# Patient Record
Sex: MALE | Race: WHITE | NOT HISPANIC OR LATINO | Employment: OTHER | URBAN - METROPOLITAN AREA
[De-identification: names, ages, dates, MRNs, and addresses within clinical notes are randomized per-mention and may not be internally consistent; named-entity substitution may affect disease eponyms.]

---

## 2017-02-21 ENCOUNTER — LAB VISIT (OUTPATIENT)
Dept: LAB | Facility: HOSPITAL | Age: 67
End: 2017-02-21
Attending: INTERNAL MEDICINE
Payer: MEDICARE

## 2017-02-21 ENCOUNTER — OFFICE VISIT (OUTPATIENT)
Dept: INTERNAL MEDICINE | Facility: CLINIC | Age: 67
End: 2017-02-21
Payer: MEDICARE

## 2017-02-21 VITALS
WEIGHT: 184.75 LBS | BODY MASS INDEX: 25.02 KG/M2 | DIASTOLIC BLOOD PRESSURE: 92 MMHG | HEART RATE: 72 BPM | HEIGHT: 72 IN | SYSTOLIC BLOOD PRESSURE: 148 MMHG

## 2017-02-21 DIAGNOSIS — R42 LIGHTHEADEDNESS: ICD-10-CM

## 2017-02-21 DIAGNOSIS — M79.10 MUSCLE ACHE: ICD-10-CM

## 2017-02-21 DIAGNOSIS — R42 LIGHTHEADEDNESS: Primary | ICD-10-CM

## 2017-02-21 DIAGNOSIS — R00.2 FLUTTERING SENSATION OF HEART: ICD-10-CM

## 2017-02-21 DIAGNOSIS — K21.9 GASTROESOPHAGEAL REFLUX DISEASE WITHOUT ESOPHAGITIS: ICD-10-CM

## 2017-02-21 LAB
ALBUMIN SERPL BCP-MCNC: 4.3 G/DL
ALP SERPL-CCNC: 111 U/L
ALT SERPL W/O P-5'-P-CCNC: 22 U/L
ANION GAP SERPL CALC-SCNC: 8 MMOL/L
AST SERPL-CCNC: 21 U/L
BASOPHILS # BLD AUTO: 0.04 K/UL
BASOPHILS NFR BLD: 0.5 %
BILIRUB SERPL-MCNC: 1.1 MG/DL
BILIRUB UR QL STRIP: NEGATIVE
BUN SERPL-MCNC: 15 MG/DL
CALCIUM SERPL-MCNC: 9.4 MG/DL
CHLORIDE SERPL-SCNC: 103 MMOL/L
CLARITY UR REFRACT.AUTO: CLEAR
CO2 SERPL-SCNC: 29 MMOL/L
COLOR UR AUTO: YELLOW
CREAT SERPL-MCNC: 1 MG/DL
DIFFERENTIAL METHOD: ABNORMAL
EOSINOPHIL # BLD AUTO: 0.6 K/UL
EOSINOPHIL NFR BLD: 7.4 %
ERYTHROCYTE [DISTWIDTH] IN BLOOD BY AUTOMATED COUNT: 13.2 %
EST. GFR  (AFRICAN AMERICAN): >60 ML/MIN/1.73 M^2
EST. GFR  (NON AFRICAN AMERICAN): >60 ML/MIN/1.73 M^2
GLUCOSE SERPL-MCNC: 95 MG/DL
GLUCOSE UR QL STRIP: NEGATIVE
HCT VFR BLD AUTO: 46 %
HGB BLD-MCNC: 16.2 G/DL
HGB UR QL STRIP: NEGATIVE
KETONES UR QL STRIP: NEGATIVE
LEUKOCYTE ESTERASE UR QL STRIP: NEGATIVE
LYMPHOCYTES # BLD AUTO: 2.3 K/UL
LYMPHOCYTES NFR BLD: 31.3 %
MAGNESIUM SERPL-MCNC: 1.9 MG/DL
MCH RBC QN AUTO: 31.2 PG
MCHC RBC AUTO-ENTMCNC: 35.2 %
MCV RBC AUTO: 89 FL
MONOCYTES # BLD AUTO: 0.4 K/UL
MONOCYTES NFR BLD: 5.9 %
NEUTROPHILS # BLD AUTO: 4.1 K/UL
NEUTROPHILS NFR BLD: 54.6 %
NITRITE UR QL STRIP: NEGATIVE
PH UR STRIP: 5 [PH] (ref 5–8)
PLATELET # BLD AUTO: 272 K/UL
PMV BLD AUTO: 10.8 FL
POTASSIUM SERPL-SCNC: 4.1 MMOL/L
PROT SERPL-MCNC: 7.5 G/DL
PROT UR QL STRIP: NEGATIVE
RBC # BLD AUTO: 5.19 M/UL
SODIUM SERPL-SCNC: 140 MMOL/L
SP GR UR STRIP: 1.01 (ref 1–1.03)
URN SPEC COLLECT METH UR: NORMAL
UROBILINOGEN UR STRIP-ACNC: NEGATIVE EU/DL
WBC # BLD AUTO: 7.47 K/UL

## 2017-02-21 PROCEDURE — 80053 COMPREHEN METABOLIC PANEL: CPT

## 2017-02-21 PROCEDURE — 36415 COLL VENOUS BLD VENIPUNCTURE: CPT

## 2017-02-21 PROCEDURE — 93010 ELECTROCARDIOGRAM REPORT: CPT | Mod: ,,, | Performed by: INTERNAL MEDICINE

## 2017-02-21 PROCEDURE — 83735 ASSAY OF MAGNESIUM: CPT

## 2017-02-21 PROCEDURE — 99999 PR PBB SHADOW E&M-EST. PATIENT-LVL III: CPT | Mod: PBBFAC,,, | Performed by: INTERNAL MEDICINE

## 2017-02-21 PROCEDURE — 85025 COMPLETE CBC W/AUTO DIFF WBC: CPT

## 2017-02-21 PROCEDURE — 99215 OFFICE O/P EST HI 40 MIN: CPT | Mod: S$PBB,,, | Performed by: INTERNAL MEDICINE

## 2017-02-21 RX ORDER — PANTOPRAZOLE SODIUM 40 MG/1
40 TABLET, DELAYED RELEASE ORAL DAILY
Qty: 30 TABLET | Refills: 11 | Status: SHIPPED | OUTPATIENT
Start: 2017-02-21 | End: 2017-12-11

## 2017-02-21 NOTE — PROGRESS NOTES
CHIEF COMPLAINT:  Fluttering in chest and lightheadedness.    HISTORY OF PRESENT ILLNESS:  The patient is a 66-year-old gentleman who comes in   today with complaints of a weird feeling in his chest.  It is like a bubbling   or fluttering type sensation.  It comes and goes.  It can last a minute or so or   less.  It happens several times throughout the day.  Some days, it does not   happen at all.  It started a few months ago.  It feels like he needs to stop   whatever he is doing when it occurs.  Sometimes, he does not.  Sometimes, he   does.  He also reports feeling a little lightheaded, cannot be more specific   than that.  His head feels a little weird, sometimes associated with his heart   fluttering and sometimes not.    REVIEW OF SYSTEMS:  The patient does report weight loss.  He is trying to get   his weight down.  He went from around 191 to 184.  He has done so by watching   his intake and cutting out red meat.  He does eat fish.  No visual change.  No   auditory change.  No dysphagia.  He was walking 4 to 5 times a week, but stopped   secondary to these fluttering in the chest.  He wanted to get this worked up   first.  No nausea or vomiting.  No abdominal pain.  He does report having some   reflux-type symptoms where he gets a taste of acid in the back of his throat.    He does state that sometimes he will make himself belch, which may or may not   help the fluttering.  No loss of bowel control.  No problems with his bladder.    No weakness in the arms or legs.  He does get some twinges of pain in the legs.    He did have 1 episode of pain and numbness in the right hand, which lasted just   1 day.    PHYSICAL EXAMINATION:  GENERAL APPEARANCE:  No acute distress.  HEENT:  Conjunctivae are clear.  Pupils are equal and reactive.  TMs are   obscured by wax with the right being worse than the left.  Nasal septum is   midline without discharge.  Oropharynx is without erythema.  The patient does   have some  carious molars.  Trachea is midline without thyromegaly.  PULMONARY:  Good inspiratory and expiratory breath sounds are heard.  Lungs are   clear to auscultation.  CARDIOVASCULAR:  S1 and S2.  EXTREMITIES:  Without edema.  ABDOMEN:  Nontender, nondistended and without hepatosplenomegaly.  VASCULAR:  The patient's carotids were checked.  He had 2+ carotid pulses   without bruits.    ASSESSMENT:  1.  Lightheadedness.  2.  Fluttering sensation in the chest.  3.  Muscle aches.  4.  Reflux.    PLAN:  We will go ahead and put the patient in for a 24-hour Holter.  We will   get a CBC, CMP and magnesium level.  We will also schedule a UA.  We will start   him on Protonix once a day.  The patient is to follow up pending results.      CONNIE/KATE  dd: 02/21/2017 14:22:50 (CST)  td: 02/22/2017 06:47:09 (CST)  Doc ID   #[id number]  Job ID #[id number]    CC:

## 2017-02-21 NOTE — MR AVS SNAPSHOT
[facility]  [address]  [place] [address]  Phone: [phone]  Fax: [phone]                  David Martin   2017 10:00 AM   Office Visit    Description:  Male : 1950   Provider:  Alexandro Hair MD   Department:  [facility]           Reason for Visit     Follow-up           Diagnoses this Visit        Comments    Lightheadedness    -  Primary     Fluttering sensation of heart         Muscle ache         Gastroesophageal reflux disease without esophagitis                To Do List           Goals (5 Years of Data)     None       These Medications        Disp Refills Start End    pantoprazole (PROTONIX) 40 MG tablet 30 tablet 11 2017 3/23/2017    Take 1 tablet (40 mg total) by mouth once daily. - Oral    Pharmacy: [facility] [address]  [place], [place] [facility] [address] [facility] [address] [facility] [address] Ph #: [phone]         [facility] On Call     [facility] On Call Nurse Care Line -  Assistance  Registered nurses in the [facility] On Call Center provide clinical advisement, health education, appointment booking, and other advisory services.  Call for this free service at [phone].             Medications           Message regarding Medications     Verify the changes and/or additions to your medication regime listed below are the same as discussed with your clinician today.  If any of these changes or additions are incorrect, please notify your healthcare provider.        START taking these NEW medications        Refills    pantoprazole (PROTONIX) 40 MG tablet 11    Sig: Take 1 tablet (40 mg total) by mouth once daily.    Class: Normal    Route: Oral      STOP taking these medications     predniSONE (DELTASONE) 10 MG tablet 3 tablets daily x 3 days, 2 tablets daily x 3 days, 1 tablet daily x 3 days           Verify that the below list of medications is an accurate representation of the medications you are currently taking.   If none reported, the list may be blank. If incorrect, please contact your healthcare provider. Carry this list with you in case of emergency.           Current Medications     albuterol 90 mcg/actuation inhaler Inhale 2 puffs into the lungs every 6 (six) hours as needed for Wheezing. Ventolin inhaler    sodium,potassium,mag sulfates (SUPREP BOWEL PREP KIT) 17.5-3.13-1.6 gram SolR Take 1 Bottle by mouth as directed.    VERAMYST 27.5 mcg/actuation nasal spray 1 spray by Nasal route once daily.    zolpidem (AMBIEN) 10 mg Tab     pantoprazole (PROTONIX) 40 MG tablet Take 1 tablet (40 mg total) by mouth once daily.           Clinical Reference Information           Your Vitals Were     BP Pulse Height Weight BMI    148/92 (BP Location: Right arm, Patient Position: Sitting, BP Method: Manual) 72 6' (1.829 m) 83.8 kg (184 lb 11.9 oz) 25.06 kg/m2      Blood Pressure          Most Recent Value    BP  (!)  148/92      Allergies as of 2/21/2017     Pollen Extracts    Ragweed      Immunizations Administered on Date of Encounter - 2/21/2017     None      Orders Placed During Today's Visit      Normal Orders This Visit    Urinalysis     Future Labs/Procedures Expected by Expires    CBC auto differential  2/21/2017 2/21/2018    Magnesium  2/21/2017 2/21/2018    Comprehensive metabolic panel  8/24/2017 (Approximate) 2/21/2018    Holter monitor - 24 hour  As directed 2/21/2018    SCHEDULED EKG 12-LEAD (to Muse)  As directed 2/21/2018      Language Assistance Services     ATTENTION: Language assistance services are available, free of charge. Please call [phone].      ATENCIÓN: Si habla español, tiene a avitia disposición servicios gratuitos de asistencia lingüística. Llame al [phone].     CRAIG Ý: N?u b?n nói Ti?ng Vi?t, có các d?ch v? h? tr? ngôn ng? mi?n phí dành cho b?n. G?i s? [phone].         [facility] complies with applicable Federal civil rights laws and does not discriminate on the basis of  race, color, national origin, age, disability, or sex.

## 2017-02-24 ENCOUNTER — CLINICAL SUPPORT (OUTPATIENT)
Dept: ELECTROPHYSIOLOGY | Facility: CLINIC | Age: 67
End: 2017-02-24
Payer: MEDICARE

## 2017-02-24 ENCOUNTER — NURSE TRIAGE (OUTPATIENT)
Dept: ADMINISTRATIVE | Facility: CLINIC | Age: 67
End: 2017-02-24

## 2017-02-24 DIAGNOSIS — R00.2 FLUTTERING SENSATION OF HEART: ICD-10-CM

## 2017-02-24 PROCEDURE — 93227 XTRNL ECG REC<48 HR R&I: CPT | Mod: S$PBB,,, | Performed by: INTERNAL MEDICINE

## 2017-02-24 PROCEDURE — 93226 XTRNL ECG REC<48 HR SCAN A/R: CPT | Mod: PBBFAC | Performed by: INTERNAL MEDICINE

## 2017-02-24 NOTE — TELEPHONE ENCOUNTER
Reason for Disposition   General information question, no triage required and triager able to answer question    Protocols used: ST INFORMATION ONLY CALL-A-AH  question re:holter monitor   --answered     Chari Queen RN

## 2017-03-01 ENCOUNTER — TELEPHONE (OUTPATIENT)
Dept: INTERNAL MEDICINE | Facility: CLINIC | Age: 67
End: 2017-03-01

## 2017-03-01 DIAGNOSIS — R07.89 CHEST DISCOMFORT: ICD-10-CM

## 2017-03-01 DIAGNOSIS — R00.2 HEART PALPITATIONS: Primary | ICD-10-CM

## 2017-03-01 NOTE — TELEPHONE ENCOUNTER
----- Message from Alexandro Hair MD sent at 3/1/2017  7:55 AM CST -----  Please contact patient. Notify that his heart monitor did not show a cause for his heart fluttering. I would like to get a stress echo. Orders are in.

## 2017-03-08 ENCOUNTER — HOSPITAL ENCOUNTER (OUTPATIENT)
Dept: CARDIOLOGY | Facility: CLINIC | Age: 67
Discharge: HOME | End: 2017-03-08
Payer: MEDICARE

## 2017-03-08 DIAGNOSIS — R00.2 HEART PALPITATIONS: ICD-10-CM

## 2017-03-08 DIAGNOSIS — R07.89 CHEST DISCOMFORT: ICD-10-CM

## 2017-03-08 LAB
DIASTOLIC DYSFUNCTION: NO
RETIRED EF AND QEF - SEE NOTES: 60 (ref 55–65)

## 2017-03-08 PROCEDURE — 93321 DOPPLER ECHO F-UP/LMTD STD: CPT | Mod: 26,S$PBB,, | Performed by: INTERNAL MEDICINE

## 2017-03-08 PROCEDURE — 93351 STRESS TTE COMPLETE: CPT | Mod: PBBFAC | Performed by: INTERNAL MEDICINE

## 2017-03-09 ENCOUNTER — TELEPHONE (OUTPATIENT)
Dept: INTERNAL MEDICINE | Facility: CLINIC | Age: 67
End: 2017-03-09

## 2017-03-09 NOTE — TELEPHONE ENCOUNTER
----- Message from Nadya Barriga sent at 3/9/2017  6:14 AM CST -----  Contact: self  Patient states still experiencing dizziness need appointment for  tomorrow.   Please call pt at [phone]

## 2017-03-14 ENCOUNTER — OFFICE VISIT (OUTPATIENT)
Dept: INTERNAL MEDICINE | Facility: CLINIC | Age: 67
End: 2017-03-14
Payer: MEDICARE

## 2017-03-14 VITALS
DIASTOLIC BLOOD PRESSURE: 91 MMHG | HEART RATE: 59 BPM | BODY MASS INDEX: 25.44 KG/M2 | SYSTOLIC BLOOD PRESSURE: 151 MMHG | WEIGHT: 187.81 LBS | HEIGHT: 72 IN

## 2017-03-14 DIAGNOSIS — H61.21 IMPACTED CERUMEN OF RIGHT EAR: ICD-10-CM

## 2017-03-14 DIAGNOSIS — R42 DIZZINESS: Primary | ICD-10-CM

## 2017-03-14 DIAGNOSIS — E78.5 HYPERLIPIDEMIA, UNSPECIFIED HYPERLIPIDEMIA TYPE: ICD-10-CM

## 2017-03-14 PROCEDURE — 99213 OFFICE O/P EST LOW 20 MIN: CPT | Mod: PBBFAC | Performed by: INTERNAL MEDICINE

## 2017-03-14 PROCEDURE — 99214 OFFICE O/P EST MOD 30 MIN: CPT | Mod: S$PBB,,, | Performed by: INTERNAL MEDICINE

## 2017-03-14 PROCEDURE — 99999 PR PBB SHADOW E&M-EST. PATIENT-LVL III: CPT | Mod: PBBFAC,,, | Performed by: INTERNAL MEDICINE

## 2017-03-14 NOTE — MR AVS SNAPSHOT
[facility] - Internal Medicine  [address] [facility] [address]  Phone: [phone]  Fax: [phone]                  David Martin   3/14/2017 9:30 AM   Office Visit    Description:  Male : 1950   Provider:  Alexandro Hair MD   Department:  [facility]           Reason for Visit     Dizziness           Diagnoses this Visit        Comments    Dizziness    -  Primary     Hyperlipidemia, unspecified hyperlipidemia type         Impacted cerumen of right ear                To Do List           Future Appointments        Provider Department Dept Phone    3/16/2017 9:00 AM LAB, SAME DAY [facility] [phone]    5/3/2017 3:00 PM Herman Rodriguez MD [facility] [phone]      Goals (5 Years of Data)     None      [facility] On Call     [facility] On Call Nurse Care Line - /7 Assistance  Registered nurses in the [facility] On Call Center provide clinical advisement, health education, appointment booking, and other advisory services.  Call for this free service at [phone].             Medications           Message regarding Medications     Verify the changes and/or additions to your medication regime listed below are the same as discussed with your clinician today.  If any of these changes or additions are incorrect, please notify your healthcare provider.             Verify that the below list of medications is an accurate representation of the medications you are currently taking.  If none reported, the list may be blank. If incorrect, please contact your healthcare provider. Carry this list with you in case of emergency.           Current Medications     albuterol 90 mcg/actuation inhaler Inhale 2 puffs into the lungs every 6 (six) hours as needed for Wheezing. Ventolin inhaler    pantoprazole (PROTONIX) 40 MG tablet Take 1 tablet (40 mg total) by mouth once daily.    sodium,potassium,mag sulfates (SUPREP BOWEL PREP KIT)  17.5-3.13-1.6 gram SolR Take 1 Bottle by mouth as directed.    VERAMYST 27.5 mcg/actuation nasal spray 1 spray by Nasal route once daily.    zolpidem (AMBIEN) 10 mg Tab            Clinical Reference Information           Your Vitals Were     BP Pulse Height Weight BMI    151/91 (BP Location: Left arm, Patient Position: Sitting, BP Method: Manual) 59 6' (1.829 m) 85.2 kg (187 lb 13.3 oz) 25.47 kg/m2      Blood Pressure          Most Recent Value    BP  (!)  151/91      Allergies as of 3/14/2017     Pollen Extracts    Ragweed      Immunizations Administered on Date of Encounter - 3/14/2017     None      Orders Placed During Today's Visit      Normal Orders This Visit    Ambulatory Referral to ENT     Future Labs/Procedures Expected by Expires    Lipid panel  3/14/2017 3/14/2018      Language Assistance Services     ATTENTION: Language assistance services are available, free of charge. Please call [phone].      ATENCIÓN: Si habla ricañol, tiene a avitia disposición servicios gratuitos de asistencia lingüística. Llame al [phone].     CHÚ Ý: N?u b?n nói Ti?ng Vi?t, có các d?ch v? h? tr? ngôn ng? mi?n phí molinah cho b?n. G?i s? [phone].         [facility] complies with applicable Federal civil rights laws and does not discriminate on the basis of race, color, national origin, age, disability, or sex.

## 2017-03-15 ENCOUNTER — PATIENT MESSAGE (OUTPATIENT)
Dept: OTOLARYNGOLOGY | Facility: CLINIC | Age: 67
End: 2017-03-15

## 2017-03-15 NOTE — PROGRESS NOTES
CHIEF COMPLAINT:  Followup.    HISTORY OF PRESENT ILLNESS:  The patient is a 66-year-old gentleman who comes in   today for followup of dizziness as well as chest discomfort.  The patient had a   negative stress test done.  The day after the stress test, he developed some   pain in that 10 in intensity in the epigastric area.  It occurred after eating.    Belching appeared to relieve the symptoms.  The patient also reports noticing   some blood in his stool.  He was some pink material in the toilet.  He noticed   this three times over the past month.  No problems with straining.  In regards   to the dizziness, is not all the time.  His ears have been popping.  He was   using Veramyst.  Symptoms got better with stopping this nasal spray.    REVIEW OF SYSTEMS:  No fever or chills.  The patient has not started walking   yet.  He does state when he was walking on the treadmill, he could have gone   more, but he had already gotten to 99% on what he could see on the machine, so   he asked to stop.  He states that the technician who was doing test asked him   why he wanted to stop.  He told him the reasons why he had already reached or   achieved was 99% on the machine.  He states that the technician recorded that he   became tired.  No bowel changes.    PHYSICAL EXAMINATION:  VITAL SIGNS:  Repeat blood pressure is 150/80.  HEENT:  Conjunctivae clear.  Pupils are equal.  Left TM is partially obscured by   wax, right is blocked by wax.  Nasal septum is midline without discharge.    Oropharynx is without erythema.  PULMONARY:  Good inspiratory, expiratory breath sounds are heard.  Lungs are   clear to auscultation.  CARDIOVASCULAR:  S1, S2.  EXTREMITIES:  Without edema.  ABDOMEN:  Nontender, nondistended, without hepatosplenomegaly.    ASSESSMENT:  1.  Dizziness.  2.  Hyperlipidemia.  3.  Cerumen impaction.  4.  Blood in the toilet.    PLAN:  The patient already has orders in for a colonoscopy.  He was strongly   encouraged  to get this procedure done.  We refer him to ENT for his ears.  We   will check a lipid panel.  He is to follow up pending results.      CONNIE/IN  dd: 03/15/2017 15:00:25 (CDT)  td: 03/16/2017 06:00:23 (CDT)  Doc ID   #[id number]  Job ID #[id number]    CC:

## 2017-03-16 ENCOUNTER — LAB VISIT (OUTPATIENT)
Dept: LAB | Facility: HOSPITAL | Age: 67
End: 2017-03-16
Attending: INTERNAL MEDICINE
Payer: MEDICARE

## 2017-03-16 DIAGNOSIS — E78.5 HYPERLIPIDEMIA, UNSPECIFIED HYPERLIPIDEMIA TYPE: ICD-10-CM

## 2017-03-16 LAB
CHOLEST/HDLC SERPL: 5.7 {RATIO}
HDL/CHOLESTEROL RATIO: 17.4 %
HDLC SERPL-MCNC: 241 MG/DL
HDLC SERPL-MCNC: 42 MG/DL
LDLC SERPL CALC-MCNC: 174.8 MG/DL
NONHDLC SERPL-MCNC: 199 MG/DL
TRIGL SERPL-MCNC: 121 MG/DL

## 2017-03-16 PROCEDURE — 80061 LIPID PANEL: CPT

## 2017-03-16 PROCEDURE — 36415 COLL VENOUS BLD VENIPUNCTURE: CPT

## 2017-03-20 ENCOUNTER — PATIENT MESSAGE (OUTPATIENT)
Dept: INTERNAL MEDICINE | Facility: CLINIC | Age: 67
End: 2017-03-20

## 2017-03-21 ENCOUNTER — INITIAL CONSULT (OUTPATIENT)
Dept: OPTOMETRY | Facility: CLINIC | Age: 67
End: 2017-03-21
Payer: MEDICARE

## 2017-03-21 ENCOUNTER — OFFICE VISIT (OUTPATIENT)
Dept: OTOLARYNGOLOGY | Facility: CLINIC | Age: 67
End: 2017-03-21
Payer: MEDICARE

## 2017-03-21 VITALS
BODY MASS INDEX: 25.41 KG/M2 | WEIGHT: 187.38 LBS | SYSTOLIC BLOOD PRESSURE: 156 MMHG | HEART RATE: 62 BPM | DIASTOLIC BLOOD PRESSURE: 89 MMHG

## 2017-03-21 DIAGNOSIS — H61.23 IMPACTED CERUMEN OF BOTH EARS: ICD-10-CM

## 2017-03-21 DIAGNOSIS — H15.101 EPISCLERITIS OF RIGHT EYE: Primary | ICD-10-CM

## 2017-03-21 DIAGNOSIS — H93.8X3 SENSATION OF FULLNESS IN BOTH EARS: ICD-10-CM

## 2017-03-21 PROCEDURE — 69210 REMOVE IMPACTED EAR WAX UNI: CPT | Mod: S$PBB,,, | Performed by: OTOLARYNGOLOGY

## 2017-03-21 PROCEDURE — 99212 OFFICE O/P EST SF 10 MIN: CPT | Mod: PBBFAC,27,25 | Performed by: OPTOMETRIST

## 2017-03-21 PROCEDURE — 92002 INTRM OPH EXAM NEW PATIENT: CPT | Mod: S$PBB,,, | Performed by: OPTOMETRIST

## 2017-03-21 PROCEDURE — 99999 PR PBB SHADOW E&M-EST. PATIENT-LVL II: CPT | Mod: PBBFAC,,, | Performed by: OPTOMETRIST

## 2017-03-21 PROCEDURE — 99999 PR PBB SHADOW E&M-EST. PATIENT-LVL III: CPT | Mod: PBBFAC,,, | Performed by: OTOLARYNGOLOGY

## 2017-03-21 PROCEDURE — 99213 OFFICE O/P EST LOW 20 MIN: CPT | Mod: 25,S$PBB,, | Performed by: OTOLARYNGOLOGY

## 2017-03-21 RX ORDER — FLUOROMETHOLONE 1 MG/ML
1 SUSPENSION/ DROPS OPHTHALMIC 4 TIMES DAILY
Qty: 5 ML | Refills: 0 | Status: SHIPPED | OUTPATIENT
Start: 2017-03-21 | End: 2017-03-25

## 2017-03-21 NOTE — PROGRESS NOTES
Subjective:       Patient ID: David Martin is a 66 y.o. male.    Chief Complaint: Ear Fullness    Ear Fullness    There is pain in both ears. This is a new problem. The current episode started more than 1 month ago. The problem occurs constantly. The problem has been unchanged. There has been no fever. The patient is experiencing no pain. Associated symptoms include hearing loss. Pertinent negatives include no coughing, diarrhea, ear discharge, headaches, neck pain, rash, rhinorrhea or vomiting. He has tried nothing for the symptoms. His past medical history is significant for hearing loss. There is no history of a chronic ear infection or a tympanostomy tube.     Review of Systems   Constitutional: Negative for activity change, appetite change and fever.   HENT: Positive for hearing loss. Negative for congestion, ear discharge, ear pain, nosebleeds, postnasal drip, rhinorrhea and sneezing.         New onset bilateral ear fullness dating back to last September.   Eyes: Negative for redness and visual disturbance.   Respiratory: Negative for apnea, cough, shortness of breath and wheezing.    Cardiovascular: Negative for chest pain and palpitations.   Gastrointestinal: Negative for diarrhea and vomiting.   Genitourinary: Negative for difficulty urinating and frequency.   Musculoskeletal: Negative for arthralgias, back pain, gait problem and neck pain.   Skin: Negative for color change and rash.   Neurological: Negative for dizziness, speech difficulty, weakness and headaches.   Hematological: Negative for adenopathy. Does not bruise/bleed easily.   Psychiatric/Behavioral: Negative for agitation and behavioral problems.       Objective:        Constitutional:   Vital signs are normal. He appears well-developed and well-nourished. He is active. Normal speech.      Head:  Normocephalic and atraumatic. Salivary glands normal.  Facial strength is normal.      Ears:    Right Ear: Decreased hearing is noted.   Left Ear:  Decreased hearing is noted.       PROCEDURE NOTE:  Ceruminosis is noted in both EACs.  Wax was removed by manual debridement and suctioning utilizing the assistance of binocular microscopy revealing EACs and TMs WNL. The patient tolerated this procedure without difficulty. The subjective decrease noted in hearing pre-cleaning was resolved post-cleaning.        Nose:  Nose normal including turbinates, nasal mucosa, sinuses and nasal septum.     Mouth/Throat  Oropharynx clear and moist without lesions or asymmetry and normal uvula midline.     Neck:  Neck normal without thyromegaly masses, asymmetry, normal tracheal structure, crepitus, and tenderness, thyroid normal, trachea normal, phonation normal and full range of motion with neck supple.     Psychiatric:   He has a normal mood and affect. His speech is normal and behavior is normal.     Neurological:   He has neurological normal, alert and oriented.     Skin:   No abrasions, lacerations, lesions, or rashes.       Assessment:       1. Sensation of fullness in both ears    2. Impacted cerumen of both ears        Plan:       1. Hearing conservation strongly recommended.  2. Trial of amplification bilaterally may be indicated but only after audiometric testing is documented.  3. Re-check of hearing in 18-24 months or sooner if subjective change noted.  4. F/U with PCP as per schedule.

## 2017-03-21 NOTE — PROGRESS NOTES
HPI     Patient's age: 66 y.o.    Approximate date of last eye examination:  6-7 mos ago   [facility]    Pt states that right eye was having pain sensitive to touch and with   movement, yesterday was really red.  Felt as if something was in it x   statrted a week ago but got worse in the last few days, today not as   worse.    Wears glasses? yes      Wears CLs?:  Yes, out at the moment, do not sleep in them,  part-time wear                         Headaches?  no  Eye pain/discomfort?  yes                                                                                    Flashes?  no  Floaters?  no  Diplopia/Double vision?  no    Patient's Ocular History:         Any eye surgeries? no         Any eye injury?  no         Any treatment for eye disease?  no  Family history of eye disease?  no    Significant patient medical history:         1. Diabetes?  no       If yes, IDDM or NIDDM? no   2. HBP?  borderline                 ! OTC eyedrops currently using:  none   ! Prescription eye meds currently using:  none            Last edited by Erlinda Nguyen MA on 3/21/2017 10:27 AM.         Assessment /Plan     For exam results, see Encounter Report.    Episcleritis of right eye    Other orders  -     fluorometholone 0.1% (FML) 0.1 % DrpS; Place 1 drop into both eyes 4 (four) times daily. QID x 4 days then BID x 1 week  Dispense: 5 mL; Refill: 0    RTC if condition worsens or does not improve    RTC for annual

## 2017-03-21 NOTE — MR AVS SNAPSHOT
[facility] Otorhinolaryngology  [address]  [facility] [address]  Phone: [phone]  Fax: [phone]                  David Martin   3/21/2017 8:30 AM   Office Visit    Description:  Male : 1950   Provider:  Ayo Grove MD   [facility]           Reason for Visit     Ear Fullness           Diagnoses this Visit        Comments    Sensation of fullness in both ears         Impacted cerumen of both ears                To Do List           Future Appointments        Provider Department Dept Phone    3/21/2017 8:30 AM Ayo Grove MD [facility] Otorhinolaryngology [phone]    3/23/2017 10:15 AM Kadi King OD [facility] - Optometry [phone]    5/3/2017 3:00 PM Herman Rodriguez MD [facility] - Voice Center [phone]      Goals (5 Years of Data)     None      Follow-Up and Disposition     Return if symptoms worsen or fail to improve.      [facility] On Call     [facility] On Call Nurse Care Line -  Assistance  Registered nurses in the [facility] Call Center provide clinical advisement, health education, appointment booking, and other advisory services.  Call for this free service at [phone].             Medications           Message regarding Medications     Verify the changes and/or additions to your medication regime listed below are the same as discussed with your clinician today.  If any of these changes or additions are incorrect, please notify your healthcare provider.        STOP taking these medications     albuterol 90 mcg/actuation inhaler Inhale 2 puffs into the lungs every 6 (six) hours as needed for Wheezing. Ventolin inhaler    sodium,potassium,mag sulfates (SUPREP BOWEL PREP KIT) 17.5-3.13-1.6 gram SolR Take 1 Bottle by mouth as directed.           Verify that the below list of medications is an accurate representation of the medications you are currently taking.  If none reported, the list may be blank. If  incorrect, please contact your healthcare provider. Carry this list with you in case of emergency.           Current Medications     pantoprazole (PROTONIX) 40 MG tablet Take 1 tablet (40 mg total) by mouth once daily.    VERAMYST 27.5 mcg/actuation nasal spray 1 spray by Nasal route once daily.    zolpidem (AMBIEN) 10 mg Tab            Clinical Reference Information           Your Vitals Were     BP Pulse Weight BMI       156/89 (BP Location: Right arm, Patient Position: Sitting, BP Method: Automatic) 62 85 kg (187 lb 6.3 oz) 25.41 kg/m2       Blood Pressure          Most Recent Value    BP  (!)  156/89      Allergies as of 3/21/2017     Pollen Extracts    Ragweed      Immunizations Administered on Date of Encounter - 3/21/2017     None      Language Assistance Services     ATTENTION: Language assistance services are available, free of charge. Please call [phone].      ATENCIÓN: Si habla mariola, tiene a avitia disposición servicios gratuitos de asistencia lingüística. Llame al [phone].     CRAIG Ý: N?u b?n nói Ti?ng Vi?t, có các d?ch v? h? tr? ngôn ng? mi?n phí dành cho b?n. G?i s? [phone].         [facility] complies with applicable Federal civil rights laws and does not discriminate on the basis of race, color, national origin, age, disability, or sex.

## 2017-03-22 ENCOUNTER — PATIENT MESSAGE (OUTPATIENT)
Dept: INTERNAL MEDICINE | Facility: CLINIC | Age: 67
End: 2017-03-22

## 2017-03-24 ENCOUNTER — PATIENT MESSAGE (OUTPATIENT)
Dept: OPTOMETRY | Facility: CLINIC | Age: 67
End: 2017-03-24

## 2017-04-05 ENCOUNTER — OFFICE VISIT (OUTPATIENT)
Dept: OPTOMETRY | Facility: CLINIC | Age: 67
End: 2017-04-05
Payer: MEDICARE

## 2017-04-05 DIAGNOSIS — H52.223 REGULAR ASTIGMATISM OF BOTH EYES: ICD-10-CM

## 2017-04-05 DIAGNOSIS — H10.13 CONJUNCTIVITIS, ALLERGIC, BILATERAL: ICD-10-CM

## 2017-04-05 DIAGNOSIS — H40.003 GLAUCOMA SUSPECT, BILATERAL: Primary | ICD-10-CM

## 2017-04-05 DIAGNOSIS — H52.4 PRESBYOPIA: ICD-10-CM

## 2017-04-05 DIAGNOSIS — H25.13 NS (NUCLEAR SCLEROSIS), BILATERAL: ICD-10-CM

## 2017-04-05 PROCEDURE — 99999 PR PBB SHADOW E&M-EST. PATIENT-LVL II: CPT | Mod: PBBFAC,,, | Performed by: OPTOMETRIST

## 2017-04-05 PROCEDURE — 92014 COMPRE OPH EXAM EST PT 1/>: CPT | Mod: S$PBB,,, | Performed by: OPTOMETRIST

## 2017-04-05 PROCEDURE — 92015 DETERMINE REFRACTIVE STATE: CPT | Mod: ,,, | Performed by: OPTOMETRIST

## 2017-04-05 PROCEDURE — 99212 OFFICE O/P EST SF 10 MIN: CPT | Mod: PBBFAC,25 | Performed by: OPTOMETRIST

## 2017-04-05 PROCEDURE — 92133 CPTRZD OPH DX IMG PST SGM ON: CPT | Mod: PBBFAC | Performed by: OPTOMETRIST

## 2017-04-05 NOTE — LETTER
April 5, 2017      HÉCTOR Ying MD  [address]  [address]  [place] [facility] [address]           [facility] - Optometry  [address]  [facility] [address]  Phone: [phone]  Fax: [phone]          Patient: David Martin   MR Number: [id number]   YOB: 1950   Date of Visit: 4/5/2017       Dear Dr. HÉCTOR Ying:    Thank you for referring David Martin to me for evaluation. Attached you will find relevant portions of my assessment and plan of care.    If you have questions, please do not hesitate to call me. I look forward to following David Martin along with you.    Sincerely,    Kadi King, OD    Enclosure  CC:  No Recipients    If you would like to receive this communication electronically, please contact [email or web address] or [phone] to request more information on [employer] access.    For providers and/or their staff who would like to refer a patient to [facility], please contact us through our one-stop-shop provider referral line, [facility], at [phone].    If you feel you have received this communication in error or would no longer like to receive these types of communications, please e-mail [email or web address]

## 2017-04-05 NOTE — PROGRESS NOTES
HPI     Patient's age: 66 y.o.    Approximate date of last eye examination:  3/21/17  Name of last eye doctor seen: Dr. King    Pt states that here for follow up exam, still feel like something with   right eye other eye o.k.    Wears glasses? yes      Wears CLs?:  yes            ! OTC eyedrops currently using:  Alloway - OU haven't started   ! Prescription eye meds currently using:  Fluorometholone QID - OU   stopped 5-6 days ago            Last edited by Erlinda Nguyen MA on 4/5/2017  9:11 AM.         Assessment /Plan     For exam results, see Encounter Report.    Glaucoma suspect, bilateral  -     Posterior Segment OCT Optic Nerve-Mild thinning inf temp OU   Cupping OU, healthy rim appearance   IOP WNL   Return 1 year OCT optic nerve and HVF 24-2    NS (nuclear sclerosis), bilateral   Mild, monitor    Conjunctivitis, allergic, bilateral   Use Alaway BID OU x 2 weeks    Presbyopia  Regular astigmatism of both eyes   Rx specs, discussed LASIK   Pt will return for contact lens fit

## 2017-04-20 ENCOUNTER — TELEPHONE (OUTPATIENT)
Dept: INTERNAL MEDICINE | Facility: CLINIC | Age: 67
End: 2017-04-20

## 2017-04-20 NOTE — TELEPHONE ENCOUNTER
Please call patient.  I received a prior authorization for his Veramyst.  As an alternative, he could try Flonase OTC or if he wants to  the rx here we can see if our pharmacy can get the Veramyst covered.  Let me know what he wants to do.

## 2017-05-01 ENCOUNTER — TELEPHONE (OUTPATIENT)
Dept: OPTOMETRY | Facility: CLINIC | Age: 67
End: 2017-05-01

## 2017-05-01 ENCOUNTER — TELEPHONE (OUTPATIENT)
Dept: INTERNAL MEDICINE | Facility: CLINIC | Age: 67
End: 2017-05-01

## 2017-05-01 DIAGNOSIS — J31.0 CHRONIC RHINITIS: Primary | ICD-10-CM

## 2017-05-01 NOTE — TELEPHONE ENCOUNTER
----- Message from James No sent at 5/1/2017 12:20 PM CDT -----  Contact: self/ [phone] cell  Type: Returning a call    Who left a message? Perla    When did the practice call? 04/27/2017    Comments: Pt returned call and was told the message regarding the PA needed for his Vermyst.  Pt states that he likes the Veramyst and would like to see if the pharmacy in primary care could assist him in getting it.  He was told about Flonase OTC also.  Please call and advise.    Thank you

## 2017-05-01 NOTE — TELEPHONE ENCOUNTER
Spoke with pt informed Dr. King is ordering specialty contacts for him(Proclear XR) and we will call him when they come in, for fitting.

## 2017-05-03 ENCOUNTER — OFFICE VISIT (OUTPATIENT)
Dept: OTOLARYNGOLOGY | Facility: CLINIC | Age: 67
End: 2017-05-03
Payer: MEDICARE

## 2017-05-03 VITALS
HEIGHT: 73 IN | SYSTOLIC BLOOD PRESSURE: 131 MMHG | DIASTOLIC BLOOD PRESSURE: 79 MMHG | BODY MASS INDEX: 24.83 KG/M2 | WEIGHT: 187.38 LBS

## 2017-05-03 DIAGNOSIS — J31.0 CHRONIC RHINITIS: ICD-10-CM

## 2017-05-03 DIAGNOSIS — R49.0 DYSPHONIA: ICD-10-CM

## 2017-05-03 DIAGNOSIS — K21.9 LARYNGOPHARYNGEAL REFLUX: ICD-10-CM

## 2017-05-03 DIAGNOSIS — R09.89 THROAT CLEARING: Primary | ICD-10-CM

## 2017-05-03 DIAGNOSIS — R09.82 POSTNASAL DRIP: ICD-10-CM

## 2017-05-03 PROBLEM — H93.8X3 SENSATION OF FULLNESS IN BOTH EARS: Status: RESOLVED | Noted: 2017-03-21 | Resolved: 2017-05-03

## 2017-05-03 PROCEDURE — 99213 OFFICE O/P EST LOW 20 MIN: CPT | Mod: S$PBB,,, | Performed by: OTOLARYNGOLOGY

## 2017-05-03 PROCEDURE — 99213 OFFICE O/P EST LOW 20 MIN: CPT | Mod: PBBFAC | Performed by: OTOLARYNGOLOGY

## 2017-05-03 PROCEDURE — 99999 PR PBB SHADOW E&M-EST. PATIENT-LVL III: CPT | Mod: PBBFAC,,, | Performed by: OTOLARYNGOLOGY

## 2017-05-03 NOTE — PATIENT INSTRUCTIONS
ACID REFLUX   What is acid reflux?    When we eat, food passes from the throat and into the stomach through a tube called the esophagus. At the bottom of the esophagus is a ring of muscles that acts as a valve between the esophagus and stomach, called the lower esophageal sphincter. Smoking, alcohol, and certain types of food may weaken the sphincter, so it may stop closing properly. The contents in the stomach then may leak back, or reflux, into the esophagus. This problem is called gastroesophageal reflux disease (GERD). Symptoms of GERD include heartburn, belching, regurgitation of stomach contents, and swallowing difficulties.    Sometimes, the stomach acid travels up through the esophagus and spills into the larynx or pharynx (voice box). This is called laryngopharyngeal reflux (LPR) and is irritating to the vocal folds and surrounding tissues. Often, patients with LPR do not experience heartburn as a symptom. More commonly, symptoms of LPR include hoarseness, excessive mucous resulting in frequent throat clearing, post-nasal drip, coughing, throat soreness or burning, choking episodes, difficulty swallowing, and sensation of a lump in the throat.     How is acid reflux treated?   Treatment for acid reflux can involve any combination of medication, lifestyle modifications, and surgery.   · Medications. Your doctor may prescribe a proton pump inhibitor (PPI) or an H2 blocker. If you are prescribed a PPI, take in on an empty stomach in the morning 30 minutes prior to eating breakfast. Keep in mind that it may take 4-6 weeks before symptoms begin to resolve, so do not stop medications without consulting your doctor.   · Lifestyle and dietary modifications. Eat smaller meals at a slower pace. Avoid over-eating. If you are overweight, try to lose weight. Do not lie down or exercise directly after eating; eat your last meal of the day at least 2-3 hours prior to going to sleep. Avoid tight-fitting clothes. If you  are a smoker, reduce or quit smoking. Elevate your head of bed 4-6 inches by putting phone books under the legs at the head of your bed or buy a wedge pillow, but do not use more than two regular pillows as this causes the body to curl and compresses your stomach.     Food group Foods to avoid to reduce reflux   Beverages  Whole milk, 2% milk, chocolate milk/hot chocolate, alcohol, coffee (regular and decaf), caffeinated tea, mint tea, carbonated beverages, citrus juice    Breads/grains Commercial sweet rolls, doughnuts, croissants, and other high-fat pastries    Fruits and vegetables Fried or cream-style vegetables, tomatoes, tomato-based products, citrus fruits, hot peppers    Soups and seasonings Cream, cheese, tomato-based soups, vinegar    Meats and proteins Fatty or fried meat/fish, johnson, sausage, pepperoni, lunch meat, fried eggs    Fats and oil Lard, johnson drippings, salt pork, meat drippings, gravies, highly seasoned salad dressings, nuts    Sweets/desserts Anything made with or from chocolate, peppermint, spearmint, whole milk, or cream; high-fat pastries, gum, hard candy         THROAT CLEARING     Why am I clearing my throat so often?   Irritation of the vocal folds and surrounding area can cause the urge to clear your throat. This irritation can be caused by acid reflux, allergies, or environmental factors, as well as from a cold or sore throat. Talk with your doctor about the treatment of possible underlying causes. However, throat clearing can turn into a cycle. You clear your throat after feeling an irritation, which causes more irritation, which causes you to continue clearing your throat, which causes more irritation.     What can I do about it?   · Ask a friend or family member to help you keep track - you may not even realize how often you do it.   · Replace the throat clearing with a different behavior.   · Take a sip of water and then swallow. Repeat until the sensation subsides.  · Swallow  hard (even without water)   · Use the silent throat clear method by making the h sound when you exhale  · Breathe in deeply through your nose and exhale on shhh   · Hum quietly   · Keep yourself hydrated.   · Drink plenty of water   · Eat wet snacks (grapes, apples, melon, cucumber)   · Use a humidifier at home or at work   · Suck on hard candies, but avoid too much sugar and avoid anything with mint, menthol, camphor, or eucaplyptus, as these will have a more irritating effect.        Medicines for Acid Reflux  Your healthcare provider has told you that you have acid reflux. This condition causes stomach acid to wash up into your throat. For most people, acid reflux is troubling but not dangerous. But left untreated, acid reflux sometimes damages the esophagus. Medicines can help control acid reflux and limit your risk of future problems.  Medicines for acid reflux  Your healthcare provider may prescribe medicine to help treat your acid reflux. Medicine will be based on your symptoms and any test results. Your provider will explain how to take your medicine. You will also be told about possible side effects.  Reducing stomach acid  Your provider may suggest antacids that you can buy over the counter. Antacids can give fast relief. Or you may be told to take a type of medicine called H2 blockers. These are available over the counter and by prescription (for higher doses).  Blocking stomach acid  In more severe cases, your healthcare provider may suggest stronger medicines such as proton pump inhibitors (PPIs). These keep the stomach from making acid. They are often prescribed for long-term use.  Other medicines  In some cases medicines to reduce or block stomach acid may not work. Then you may be switched to another type of medicine that helps your stomach empty better.     Date Last Reviewed: 10/1/2016  © 2470-9446 [employer]. [address] [place] [address], [place] [address]. All rights reserved. This  information is not intended as a substitute for professional medical care. Always follow your healthcare professional's instructions.        Pantoprazole tablets  What is this medicine?  PANTOPRAZOLE (pan TOE pra zole) prevents the production of acid in the stomach. It is used to treat gastroesophageal reflux disease (GERD), inflammation of the esophagus, and Zollinger-Duffy syndrome.  How should I use this medicine?  Take this medicine by mouth. Swallow the tablets whole with a drink of water. Follow the directions on the prescription label. Do not crush, break, or chew. Take your medicine at regular intervals. Do not take your medicine more often than directed.  Talk to your pediatrician regarding the use of this medicine in children. While this drug may be prescribed for children as young as 5 years for selected conditions, precautions do apply.  What side effects may I notice from receiving this medicine?  Side effects that you should report to your doctor or health care professional as soon as possible:  · allergic reactions like skin rash, itching or hives, swelling of the face, lips, or tongue  · bone, muscle or joint pain  · breathing problems  · chest pain or chest tightness  · dark yellow or brown urine  · dizziness  · fast, irregular heartbeat  · feeling faint or lightheaded  · fever or sore throat  · muscle spasm  · palpitations  · rash on cheeks or arms that gets worse in the sun  · redness, blistering, peeling or loosening of the skin, including inside the mouth  · seizures  · tremors  · unusual bleeding or bruising  · unusually weak or tired  · yellowing of the eyes or skin  Side effects that usually do not require medical attention (Report these to your doctor or health care professional if they continue or are bothersome.):  · constipation  · diarrhea  · dry mouth  · headache  · nausea  What may interact with this medicine?  Do not take this medicine with any of the following  medications:  · atazanavir  · nelfinavir  This medicine may also interact with the following medications:  · ampicillin  · delavirdine  · erlotinib  · iron salts  · medicines for fungal infections like ketoconazole, itraconazole and voriconazole  · methotrexate  · mycophenolate mofetil  · warfarin  What if I miss a dose?  If you miss a dose, take it as soon as you can. If it is almost time for your next dose, take only that dose. Do not take double or extra doses.  Where should I keep my medicine?  Keep out of the reach of children.  Store at room temperature between 15 and 30 degrees C (59 and 86 degrees F). Protect from light and moisture. Throw away any unused medicine after the expiration date.  What should I tell my health care provider before I take this medicine?  They need to know if you have any of these conditions:  · liver disease  · low levels of magnesium in the blood  · lupus  · an unusual or allergic reaction to omeprazole, lansoprazole, pantoprazole, rabeprazole, other medicines, foods, dyes, or preservatives  · pregnant or trying to get pregnant  · breast-feeding  What should I watch for while using this medicine?  It can take several days before your stomach pain gets better. Check with your doctor or health care professional if your condition does not start to get better, or if it gets worse.  You may need blood work done while you are taking this medicine.  Date Last Reviewed:   NOTE:This sheet is a summary. It may not cover all possible information. If you have questions about this medicine, talk to your doctor, pharmacist, or health care provider. Copyright© 2016 [employer]

## 2017-05-03 NOTE — MR AVS SNAPSHOT
[facility]  [address]  [facility] [address]  Phone: [phone]  Fax: [phone]                  David Martin   5/3/2017 3:00 PM   Office Visit    Description:  Male : 1950   Provider:  Herman Rodriguez MD   Department:  [facility]           Reason for Visit     Sore Throat           Diagnoses this Visit        Comments    Throat clearing    -  Primary     Chronic rhinitis         Postnasal drip         Dysphonia         Laryngopharyngeal reflux                To Do List           Goals (5 Years of Data)     None      Follow-Up and Disposition     Return in about 3 months (around 8/3/2017).      [facility]     [facility] On Call Nurse Care Line -  Assistance  Unless otherwise directed by your provider, please contact [facility] On-[facility], our nurse care line that is available for  assistance.     Registered nurses in the [facility] On Call Center provide: appointment scheduling, clinical advisement, health education, and other advisory services.  Call: [phone] (toll free)               Medications           Message regarding Medications     Verify the changes and/or additions to your medication regime listed below are the same as discussed with your clinician today.  If any of these changes or additions are incorrect, please notify your healthcare provider.             Verify that the below list of medications is an accurate representation of the medications you are currently taking.  If none reported, the list may be blank. If incorrect, please contact your healthcare provider. Carry this list with you in case of emergency.           Current Medications     pantoprazole (PROTONIX) 40 MG tablet Take 1 tablet (40 mg total) by mouth once daily.    VERAMYST 27.5 mcg/actuation nasal spray 1 spray by Nasal route once daily.    zolpidem (AMBIEN) 10 mg Tab            Clinical Reference Information           Your Vitals Were     BP  "Height Weight BMI       131/79 (BP Location: Right arm, Patient Position: Sitting, BP Method: Automatic) 6' 1" (1.854 m) 85 kg (187 lb 6.3 oz) 24.72 kg/m2       Blood Pressure          Most Recent Value    BP  131/79      Allergies as of 5/3/2017     Pollen Extracts    Ragweed      Immunizations Administered on Date of Encounter - 5/3/2017     None      Instructions      ACID REFLUX   What is acid reflux?    When we eat, food passes from the throat and into the stomach through a tube called the esophagus. At the bottom of the esophagus is a ring of muscles that acts as a valve between the esophagus and stomach, called the lower esophageal sphincter. Smoking, alcohol, and certain types of food may weaken the sphincter, so it may stop closing properly. The contents in the stomach then may leak back, or reflux, into the esophagus. This problem is called gastroesophageal reflux disease (GERD). Symptoms of GERD include heartburn, belching, regurgitation of stomach contents, and swallowing difficulties.    Sometimes, the stomach acid travels up through the esophagus and spills into the larynx or pharynx (voice box). This is called laryngopharyngeal reflux (LPR) and is irritating to the vocal folds and surrounding tissues. Often, patients with LPR do not experience heartburn as a symptom. More commonly, symptoms of LPR include hoarseness, excessive mucous resulting in frequent throat clearing, post-nasal drip, coughing, throat soreness or burning, choking episodes, difficulty swallowing, and sensation of a lump in the throat.     How is acid reflux treated?   Treatment for acid reflux can involve any combination of medication, lifestyle modifications, and surgery.   · Medications. Your doctor may prescribe a proton pump inhibitor (PPI) or an H2 blocker. If you are prescribed a PPI, take in on an empty stomach in the morning 30 minutes prior to eating breakfast. Keep in mind that it may take 4-6 weeks before symptoms " begin to resolve, so do not stop medications without consulting your doctor.   · Lifestyle and dietary modifications. Eat smaller meals at a slower pace. Avoid over-eating. If you are overweight, try to lose weight. Do not lie down or exercise directly after eating; eat your last meal of the day at least 2-3 hours prior to going to sleep. Avoid tight-fitting clothes. If you are a smoker, reduce or quit smoking. Elevate your head of bed 4-6 inches by putting phone books under the legs at the head of your bed or buy a wedge pillow, but do not use more than two regular pillows as this causes the body to curl and compresses your stomach.     Food group Foods to avoid to reduce reflux   Beverages  Whole milk, 2% milk, chocolate milk/hot chocolate, alcohol, coffee (regular and decaf), caffeinated tea, mint tea, carbonated beverages, citrus juice    Breads/grains Commercial sweet rolls, doughnuts, croissants, and other high-fat pastries    Fruits and vegetables Fried or cream-style vegetables, tomatoes, tomato-based products, citrus fruits, hot peppers    Soups and seasonings Cream, cheese, tomato-based soups, vinegar    Meats and proteins Fatty or fried meat/fish, johnson, sausage, pepperoni, lunch meat, fried eggs    Fats and oil Lard, johnson drippings, salt pork, meat drippings, gravies, highly seasoned salad dressings, nuts    Sweets/desserts Anything made with or from chocolate, peppermint, spearmint, whole milk, or cream; high-fat pastries, gum, hard candy         THROAT CLEARING     Why am I clearing my throat so often?   Irritation of the vocal folds and surrounding area can cause the urge to clear your throat. This irritation can be caused by acid reflux, allergies, or environmental factors, as well as from a cold or sore throat. Talk with your doctor about the treatment of possible underlying causes. However, throat clearing can turn into a cycle. You clear your throat after feeling an irritation, which causes more  irritation, which causes you to continue clearing your throat, which causes more irritation.     What can I do about it?   · Ask a friend or family member to help you keep track - you may not even realize how often you do it.   · Replace the throat clearing with a different behavior.   · Take a sip of water and then swallow. Repeat until the sensation subsides.  · Swallow hard (even without water)   · Use the silent throat clear method by making the h sound when you exhale  · Breathe in deeply through your nose and exhale on shhh   · Hum quietly   · Keep yourself hydrated.   · Drink plenty of water   · Eat wet snacks (grapes, apples, melon, cucumber)   · Use a humidifier at home or at work   · Suck on hard candies, but avoid too much sugar and avoid anything with mint, menthol, camphor, or eucaplyptus, as these will have a more irritating effect.        Medicines for Acid Reflux  Your healthcare provider has told you that you have acid reflux. This condition causes stomach acid to wash up into your throat. For most people, acid reflux is troubling but not dangerous. But left untreated, acid reflux sometimes damages the esophagus. Medicines can help control acid reflux and limit your risk of future problems.  Medicines for acid reflux  Your healthcare provider may prescribe medicine to help treat your acid reflux. Medicine will be based on your symptoms and any test results. Your provider will explain how to take your medicine. You will also be told about possible side effects.  Reducing stomach acid  Your provider may suggest antacids that you can buy over the counter. Antacids can give fast relief. Or you may be told to take a type of medicine called H2 blockers. These are available over the counter and by prescription (for higher doses).  Blocking stomach acid  In more severe cases, your healthcare provider may suggest stronger medicines such as proton pump inhibitors (PPIs). These keep the stomach from  making acid. They are often prescribed for long-term use.  Other medicines  In some cases medicines to reduce or block stomach acid may not work. Then you may be switched to another type of medicine that helps your stomach empty better.     Date Last Reviewed: 10/1/2016  © 1330-6901 [employer]. [address] [place] [address]. All rights reserved. This information is not intended as a substitute for professional medical care. Always follow your healthcare professional's instructions.        Pantoprazole tablets  What is this medicine?  PANTOPRAZOLE (pan TOE pra zole) prevents the production of acid in the stomach. It is used to treat gastroesophageal reflux disease (GERD), inflammation of the esophagus, and Zollinger-Duffy syndrome.  How should I use this medicine?  Take this medicine by mouth. Swallow the tablets whole with a drink of water. Follow the directions on the prescription label. Do not crush, break, or chew. Take your medicine at regular intervals. Do not take your medicine more often than directed.  Talk to your pediatrician regarding the use of this medicine in children. While this drug may be prescribed for children as young as 5 years for selected conditions, precautions do apply.  What side effects may I notice from receiving this medicine?  Side effects that you should report to your doctor or health care professional as soon as possible:  · allergic reactions like skin rash, itching or hives, swelling of the face, lips, or tongue  · bone, muscle or joint pain  · breathing problems  · chest pain or chest tightness  · dark yellow or brown urine  · dizziness  · fast, irregular heartbeat  · feeling faint or lightheaded  · fever or sore throat  · muscle spasm  · palpitations  · rash on cheeks or arms that gets worse in the sun  · redness, blistering, peeling or loosening of the skin, including inside the mouth  · seizures  · tremors  · unusual bleeding or  bruising  · unusually weak or tired  · yellowing of the eyes or skin  Side effects that usually do not require medical attention (Report these to your doctor or health care professional if they continue or are bothersome.):  · constipation  · diarrhea  · dry mouth  · headache  · nausea  What may interact with this medicine?  Do not take this medicine with any of the following medications:  · atazanavir  · nelfinavir  This medicine may also interact with the following medications:  · ampicillin  · delavirdine  · erlotinib  · iron salts  · medicines for fungal infections like ketoconazole, itraconazole and voriconazole  · methotrexate  · mycophenolate mofetil  · warfarin  What if I miss a dose?  If you miss a dose, take it as soon as you can. If it is almost time for your next dose, take only that dose. Do not take double or extra doses.  Where should I keep my medicine?  Keep out of the reach of children.  Store at room temperature between 15 and 30 degrees C (59 and 86 degrees F). Protect from light and moisture. Throw away any unused medicine after the expiration date.  What should I tell my health care provider before I take this medicine?  They need to know if you have any of these conditions:  · liver disease  · low levels of magnesium in the blood  · lupus  · an unusual or allergic reaction to omeprazole, lansoprazole, pantoprazole, rabeprazole, other medicines, foods, dyes, or preservatives  · pregnant or trying to get pregnant  · breast-feeding  What should I watch for while using this medicine?  It can take several days before your stomach pain gets better. Check with your doctor or health care professional if your condition does not start to get better, or if it gets worse.  You may need blood work done while you are taking this medicine.  Date Last Reviewed:   NOTE:This sheet is a summary. It may not cover all possible information. If you have questions about this medicine, talk to your doctor, pharmacist,  or health care provider. Copyright© 2016 [employer]             Language Assistance Services     ATTENTION: Language assistance services are available, free of charge. Please call [phone].      ATENCIÓN: Si habpaul garnett, tiene a avitia disposición servicios gratuitos de asistencia lingüística. Llame al [phone].     CHÚ Ý: N?u b?n nói Ti?ng Vi?t, có các d?ch v? h? tr? ngôn ng? mi?n phí dành cho b?n. G?i s? [phone].         [facility] complies with applicable Federal civil rights laws and does not discriminate on the basis of race, color, national origin, age, disability, or sex.

## 2017-05-03 NOTE — PROGRESS NOTES
"[facility]  Department of Otorhinolaryngology and Communication Sciences    Subjective:      David Martin is a 66 y.o. male who presents for follow-up. He has chronic laryngopharyngeal symptoms which in the past have responded to allergy treatments. His laryngeal examination at his last visit was unremarkable.    Since his last visit, he endorses that the throat clearing has been stable and that, on occasion, he has a "funny swallow" that creates coughing. He notes that the "least bit of phlegm in his throat" can cause wheezing or coughing. He also has noted excess burping. Dr. Hair prescribed him pantoprazole, but he did not yet begin taking it.    The patient's medications, allergies, past medical, surgical, social and family histories were reviewed and updated as appropriate.    A detailed review of systems was obtained with pertinent positives as per the above HPI, and otherwise negative.      Objective:     /79 (BP Location: Right arm, Patient Position: Sitting, BP Method: Automatic)  Ht 6' 1" (1.854 m)  Wt 85 kg (187 lb 6.3 oz)  BMI 24.72 kg/m2     Constitutional: comfortable, well dressed  Psychiatric: appropriate affect  Respiratory: comfortably breathing, symmetric chest rise, no stridor  Voice: normal for age and gender  Head: normocephalic  Eyes: conjunctivae and sclerae clear  Indirect laryngoscopy is limited due to gag      Assessment:     David Martin is a 66 y.o. male with chronic laryngopharyngeal symptoms which may be indicative of reflux.     Plan:     I educated the patient on the impact of reflux on laryngopharyngeal disorders and provided suggestions on diet and lifestyle modifications that may help improve control of ongoing reflux. I recommended that he undergo a therapeutic trial of acid-suppression, as recommended by Dr. Hair. He will follow up with me in 3 months, or sooner if needed.    All questions were answered, and the patient is in agreement with the " plan.    Herman Rodriguez M.D.  [facility]  Department of Otorhinolaryngology and Communication Sciences

## 2017-05-03 NOTE — LETTER
May 9, 2017      Alexandro Hair MD  [address]  [facility] [address]           [facility]  [address]  [facility] [address]  Phone: [phone]  Fax: [phone]          Patient: David Martin   MR Number: [id number]   YOB: 1950   Date of Visit: 5/3/2017       Dear Dr. Alexandro Hair:    Thank you for referring David Martin to me for evaluation. Attached you will find relevant portions of my assessment and plan of care.    If you have questions, please do not hesitate to call me. I look forward to following David Martin along with you.    Sincerely,    Herman Rodriguez MD    Enclosure  CC:  No Recipients    If you would like to receive this communication electronically, please contact [email or web address] or [phone] to request more information on [employer] access.    For providers and/or their staff who would like to refer a patient to [facility], please contact us through our one-stop-shop provider referral line, [facility], at [phone].    If you feel you have received this communication in error or would no longer like to receive these types of communications, please e-mail [email or web address]

## 2017-05-17 ENCOUNTER — TELEPHONE (OUTPATIENT)
Dept: OPTOMETRY | Facility: CLINIC | Age: 67
End: 2017-05-17

## 2017-05-17 NOTE — TELEPHONE ENCOUNTER
Called pt regarding sample contact lens being in, will be at the  desk optical shop for  additional message Dr. King will also like to see him in the office for CL ck.  She is out this week will be back on next give office a call to scheduled CL ck.  [phone].

## 2017-06-19 ENCOUNTER — TELEPHONE (OUTPATIENT)
Dept: OPHTHALMOLOGY | Facility: CLINIC | Age: 67
End: 2017-06-19

## 2017-06-19 NOTE — TELEPHONE ENCOUNTER
----- Message from Shania Samson sent at 6/19/2017  2:06 PM CDT -----  Contact: David  Pt interested in placing order for contact lenses.  He would like to speak to the nurse with regard.  He can be reached at [phone]

## 2017-06-19 NOTE — TELEPHONE ENCOUNTER
Spoke with pt having difficulty with proclear contacts and have questions scheduled appt on tomorrow @ 10:30.

## 2017-06-20 ENCOUNTER — OFFICE VISIT (OUTPATIENT)
Dept: OPTOMETRY | Facility: CLINIC | Age: 67
End: 2017-06-20
Payer: MEDICARE

## 2017-06-20 DIAGNOSIS — H52.223 REGULAR ASTIGMATISM OF BOTH EYES: Primary | ICD-10-CM

## 2017-06-20 DIAGNOSIS — Z46.0 FITTING AND ADJUSTMENT OF SPECTACLES AND CONTACT LENSES: ICD-10-CM

## 2017-06-20 PROCEDURE — 99999 PR PBB SHADOW E&M-EST. PATIENT-LVL II: CPT | Mod: PBBFAC,,, | Performed by: OPTOMETRIST

## 2017-06-20 PROCEDURE — 92310 CONTACT LENS FITTING OU: CPT | Mod: ,,, | Performed by: OPTOMETRIST

## 2017-06-20 PROCEDURE — 99212 OFFICE O/P EST SF 10 MIN: CPT | Mod: PBBFAC | Performed by: OPTOMETRIST

## 2017-06-20 NOTE — Clinical Note
Order trials of  RX 2 biofinity toric XR for pt to try Order 2 OD lens, 1 OS lens Give trials to Dr. King when arrive

## 2017-06-20 NOTE — PROGRESS NOTES
HPI     Patient's age: 66 y.o.    Approximate date of last eye examination:  4/5/17  Name of last eye doctor seen: Dr. King    Pt states that he is wearing the Proclear toric, was given two of the same   lens, the +3.50 couldn't see any thing out of them the +1.50 was fine,   want to know if the same prescription for one eye or different just have   Questions before ordering.     Wears glasses? yes      Wears CLs?:  yes           If yes:              Type of CL worn:  Proclear toric              Wears full-time or part-time:  Full-time               Sleeps with contact lenses:  no               CL Solution used:  no                  ! OTC eyedrops currently using:  none   ! Prescription eye meds currently using:  none          Last edited by Erlinda Nguyen MA on 6/20/2017 10:37 AM. (History)            Assessment /Plan     For exam results, see Encounter Report.    Regular astigmatism of both eyes      Pt was dispensed lenses approx 1 month ago without exam  Pt did not come in with lenses today    Pt reports he was happy with vision with +1.50-3.25x090 in both eyes  Likely needs +2.25-3.25x090 OS    OK to order proclear toric XR today  Order trials of  RX 2 biofinity toric XR for pt to try  Order 2 OD lens, 1 OS lens  Give trials to Dr. King when arrive                   Contact lens exam done, see exam of same date for documentation.

## 2017-07-04 ENCOUNTER — PATIENT MESSAGE (OUTPATIENT)
Dept: OPTOMETRY | Facility: CLINIC | Age: 67
End: 2017-07-04

## 2017-07-17 ENCOUNTER — TELEPHONE (OUTPATIENT)
Dept: OPHTHALMOLOGY | Facility: CLINIC | Age: 67
End: 2017-07-17

## 2017-07-17 NOTE — TELEPHONE ENCOUNTER
Spoke with pt informed sample biofinity lens are in can schedule appt on 7/21/17 @ 9:30 for fitting/ ck.

## 2017-07-20 ENCOUNTER — PATIENT MESSAGE (OUTPATIENT)
Dept: OPTOMETRY | Facility: CLINIC | Age: 67
End: 2017-07-20

## 2017-07-30 ENCOUNTER — OFFICE VISIT (OUTPATIENT)
Dept: INTERNAL MEDICINE | Facility: CLINIC | Age: 67
End: 2017-07-30
Payer: MEDICARE

## 2017-07-30 ENCOUNTER — NURSE TRIAGE (OUTPATIENT)
Dept: ADMINISTRATIVE | Facility: CLINIC | Age: 67
End: 2017-07-30

## 2017-07-30 VITALS
DIASTOLIC BLOOD PRESSURE: 80 MMHG | HEART RATE: 64 BPM | OXYGEN SATURATION: 97 % | WEIGHT: 187.19 LBS | BODY MASS INDEX: 25.35 KG/M2 | SYSTOLIC BLOOD PRESSURE: 130 MMHG | HEIGHT: 72 IN
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
## 2017-07-30 PROCEDURE — 99213 OFFICE O/P EST LOW 20 MIN: CPT | Mod: S$PBB,,, | Performed by: FAMILY MEDICINE

## 2017-07-30 PROCEDURE — 99213 OFFICE O/P EST LOW 20 MIN: CPT | Mod: PBBFAC | Performed by: FAMILY MEDICINE

## 2017-07-30 PROCEDURE — 1126F AMNT PAIN NOTED NONE PRSNT: CPT | Mod: ,,, | Performed by: FAMILY MEDICINE

## 2017-07-30 PROCEDURE — 99999 PR PBB SHADOW E&M-EST. PATIENT-LVL III: CPT | Mod: PBBFAC,,, | Performed by: FAMILY MEDICINE

## 2017-07-30 PROCEDURE — 1159F MED LIST DOCD IN RCRD: CPT | Mod: ,,, | Performed by: FAMILY MEDICINE

## 2017-07-30 RX ORDER — DOXYCYCLINE HYCLATE 100 MG
100 TABLET ORAL 2 TIMES DAILY
Qty: 20 TABLET | Refills: 0 | Status: SHIPPED | OUTPATIENT
Start: 2017-07-30 | End: 2017-08-09

## 2017-07-30 NOTE — TELEPHONE ENCOUNTER
Reason for Disposition   [1] Red or very tender (to touch) area AND [2] getting larger over 48 hours after the bite    Protocols used:  INSECT BITE-A-    Appointment scheduled with CARR[facility] on [address] today.

## 2017-07-30 NOTE — PROGRESS NOTES
Subjective:       Patient ID: David Martin is a 66 y.o. male.    Chief Complaint: Insect Bite (Left Hand/Index Finger)  David Martin 66 y.o. male is here for office visit to review care and physical exam, reports was walking his dog a few days ago and an insect bit finger.  Slight local redness and swelling noted.  Pt worried about possible progression, is traveling to [place] soon.      HPI  Review of Systems   Constitutional: Negative for activity change, appetite change, fatigue, fever and unexpected weight change.   HENT: Negative for congestion, hearing loss, postnasal drip and rhinorrhea.    Eyes: Negative for pain, discharge and visual disturbance.   Respiratory: Negative for cough, choking and shortness of breath.    Cardiovascular: Negative for chest pain, palpitations and leg swelling.   Gastrointestinal: Negative for abdominal pain, diarrhea and vomiting.   Genitourinary: Negative for dysuria, flank pain, hematuria and urgency.   Musculoskeletal: Negative for arthralgias, back pain, joint swelling and neck pain.   Skin: Negative for color change and rash.   Neurological: Negative for dizziness, tremors, syncope, weakness, numbness and headaches.   Psychiatric/Behavioral: Negative for agitation and confusion. The patient is not hyperactive.        Objective:      Physical Exam   Constitutional: He is oriented to person, place, and time. He appears well-developed and well-nourished.   HENT:   Head: Normocephalic.   Eyes: EOM are normal. Pupils are equal, round, and reactive to light.   Neck: Normal range of motion. Neck supple. No thyromegaly present.   Cardiovascular: Normal rate.  Exam reveals no gallop and no friction rub.    No murmur heard.  Pulmonary/Chest: Effort normal. No respiratory distress. He has no wheezes.   Abdominal: Soft. Bowel sounds are normal. He exhibits no mass. There is no tenderness.   Musculoskeletal: He exhibits no edema or tenderness.   Lymphadenopathy:     He has no  cervical adenopathy.   Neurological: He is alert and oriented to person, place, and time. He has normal reflexes. No cranial nerve deficit.   Skin: Skin is warm. No rash noted.   Small 1 cm raised lesion proximal finger.  Very slight local induration, no fluctuance noted.   Psychiatric: He has a normal mood and affect. His behavior is normal.       Assessment:       No diagnosis found.    Plan:       There are no diagnoses linked to this encounter.   David was seen today for insect bite.    Diagnoses and all orders for this visit:    Insect bite, initial encounter    Other orders, soak with Epsom salt, if no improvement nex t two day:  -     doxycycline (VIBRA-TABS) 100 MG tablet; Take 1 tablet (100 mg total) by mouth 2 (two) times daily.

## 2017-07-31 ENCOUNTER — TELEPHONE (OUTPATIENT)
Dept: INTERNAL MEDICINE | Facility: CLINIC | Age: 67
End: 2017-07-31

## 2017-07-31 NOTE — TELEPHONE ENCOUNTER
----- Message from Monica Andino sent at 7/31/2017 10:38 AM CDT -----  Contact: Pt [phone]  Pt called to speak to the nurse regarding his care and would also like to request a work in appt with the provider today due to experiencing an insect bite on his left hand which is also swelling. Pt would like a call back this morning asap.    Pt can be reached at [phone].    Thanks

## 2017-12-11 ENCOUNTER — OFFICE VISIT (OUTPATIENT)
Dept: OPTOMETRY | Facility: CLINIC | Age: 67
End: 2017-12-11
Payer: MEDICARE

## 2017-12-11 DIAGNOSIS — H52.223 REGULAR ASTIGMATISM OF BOTH EYES: Primary | ICD-10-CM

## 2017-12-11 DIAGNOSIS — Z46.0 FITTING AND ADJUSTMENT OF SPECTACLES AND CONTACT LENSES: ICD-10-CM

## 2017-12-11 PROCEDURE — 92310 CONTACT LENS FITTING OU: CPT | Mod: ,,, | Performed by: OPTOMETRIST

## 2017-12-11 RX ORDER — PREDNISONE 10 MG/1
TABLET ORAL
COMMUNITY
Start: 2017-11-14 | End: 2018-04-03

## 2017-12-11 NOTE — PROGRESS NOTES
HPI     Patient in for contact lens follow-up.  Pt states that he can not see out   the new contacts haven't been wearing them at all.  Tried them 10 days ago   vision is very blurry    Contact lenses patient currently wearing:  Proclear    Any problems with Contact Lens comfort?  no    Contact lens wearing time (hours/per day): part-time    How long have Contact Lenses been in today?  none    Does patient sleep with the contact lenses in?  no        Last edited by Erlinda Nguyen MA on 12/11/2017  9:25 AM. (History)            Assessment /Plan     For exam results, see Encounter Report.    Regular astigmatism of both eyes    Pt ordered wrong lens online  Proclear contacts that pt ordered were wrong prescription:  -1.50 instead of +1.50    Gave pt sample pair of biofinity toric today distance vision 20/30 +3 OU, near J1/2  Wear x 1-2 weeks  Pt happy with distance and near vision today  OK to order new lenses if happy                   Contact lens exam done, see exam of same date for documentation.

## 2017-12-27 ENCOUNTER — TELEPHONE (OUTPATIENT)
Dept: INTERNAL MEDICINE | Facility: CLINIC | Age: 67
End: 2017-12-27

## 2017-12-27 NOTE — TELEPHONE ENCOUNTER
----- Message from Vashti Zhang sent at 12/27/2017  1:11 PM CST -----  Contact: self  Pt stated that he has a cold and needs a breathing treatment.  He would like to know if Dr Hair can do it in the office or if he would need to go to the ED.    Please call pt at [phone]

## 2017-12-30 ENCOUNTER — HOSPITAL ENCOUNTER (EMERGENCY)
Facility: HOSPITAL | Age: 67
Discharge: HOME | End: 2017-12-30
Attending: EMERGENCY MEDICINE
Payer: MEDICARE

## 2017-12-30 VITALS
SYSTOLIC BLOOD PRESSURE: 169 MMHG | HEIGHT: 72 IN | OXYGEN SATURATION: 94 % | WEIGHT: 188 LBS | DIASTOLIC BLOOD PRESSURE: 91 MMHG | BODY MASS INDEX: 25.47 KG/M2 | HEART RATE: 69 BPM | TEMPERATURE: 98 F | RESPIRATION RATE: 18 BRPM

## 2017-12-30 DIAGNOSIS — R05.9 COUGH: ICD-10-CM

## 2017-12-30 DIAGNOSIS — J40 BRONCHITIS: Primary | ICD-10-CM

## 2017-12-30 LAB
ALBUMIN SERPL BCP-MCNC: 3.9 G/DL
ALP SERPL-CCNC: 118 U/L
ALT SERPL W/O P-5'-P-CCNC: 20 U/L
ANION GAP SERPL CALC-SCNC: 9 MMOL/L
AST SERPL-CCNC: 16 U/L
BASOPHILS # BLD AUTO: 0.04 K/UL
BASOPHILS NFR BLD: 0.4 %
BILIRUB SERPL-MCNC: 0.6 MG/DL
BNP SERPL-MCNC: 28 PG/ML
BUN SERPL-MCNC: 16 MG/DL
CALCIUM SERPL-MCNC: 9.5 MG/DL
CHLORIDE SERPL-SCNC: 105 MMOL/L
CO2 SERPL-SCNC: 29 MMOL/L
CREAT SERPL-MCNC: 1 MG/DL
DIFFERENTIAL METHOD: ABNORMAL
EOSINOPHIL # BLD AUTO: 0.6 K/UL
EOSINOPHIL NFR BLD: 4.9 %
ERYTHROCYTE [DISTWIDTH] IN BLOOD BY AUTOMATED COUNT: 13.3 %
EST. GFR  (AFRICAN AMERICAN): >60 ML/MIN/1.73 M^2
EST. GFR  (NON AFRICAN AMERICAN): >60 ML/MIN/1.73 M^2
GLUCOSE SERPL-MCNC: 102 MG/DL
HCT VFR BLD AUTO: 44.2 %
HGB BLD-MCNC: 15.5 G/DL
LYMPHOCYTES # BLD AUTO: 3 K/UL
LYMPHOCYTES NFR BLD: 27.2 %
MCH RBC QN AUTO: 31.2 PG
MCHC RBC AUTO-ENTMCNC: 35.1 G/DL
MCV RBC AUTO: 89 FL
MONOCYTES # BLD AUTO: 0.9 K/UL
MONOCYTES NFR BLD: 8 %
NEUTROPHILS # BLD AUTO: 6.6 K/UL
NEUTROPHILS NFR BLD: 59 %
PLATELET # BLD AUTO: 259 K/UL
PMV BLD AUTO: 9.9 FL
POTASSIUM SERPL-SCNC: 3.9 MMOL/L
PROT SERPL-MCNC: 7.3 G/DL
RBC # BLD AUTO: 4.97 M/UL
SODIUM SERPL-SCNC: 143 MMOL/L
WBC # BLD AUTO: 11.12 K/UL

## 2017-12-30 PROCEDURE — 83880 ASSAY OF NATRIURETIC PEPTIDE: CPT

## 2017-12-30 PROCEDURE — 85025 COMPLETE CBC W/AUTO DIFF WBC: CPT

## 2017-12-30 PROCEDURE — 63600175 PHARM REV CODE 636 W HCPCS: Performed by: EMERGENCY MEDICINE

## 2017-12-30 PROCEDURE — 25000242 PHARM REV CODE 250 ALT 637 W/ HCPCS: Performed by: EMERGENCY MEDICINE

## 2017-12-30 PROCEDURE — 99284 EMERGENCY DEPT VISIT MOD MDM: CPT | Mod: 25

## 2017-12-30 PROCEDURE — 94640 AIRWAY INHALATION TREATMENT: CPT

## 2017-12-30 PROCEDURE — 80053 COMPREHEN METABOLIC PANEL: CPT

## 2017-12-30 RX ORDER — PREDNISONE 20 MG/1
60 TABLET ORAL
Status: COMPLETED | OUTPATIENT
Start: 2017-12-30 | End: 2017-12-30

## 2017-12-30 RX ORDER — ALBUTEROL SULFATE 90 UG/1
1-2 AEROSOL, METERED RESPIRATORY (INHALATION) EVERY 6 HOURS PRN
Qty: 1 INHALER | Refills: 1 | Status: SHIPPED | OUTPATIENT
Start: 2017-12-30 | End: 2018-04-03 | Stop reason: SDUPTHER

## 2017-12-30 RX ORDER — IPRATROPIUM BROMIDE AND ALBUTEROL SULFATE 2.5; .5 MG/3ML; MG/3ML
3 SOLUTION RESPIRATORY (INHALATION)
Status: COMPLETED | OUTPATIENT
Start: 2017-12-30 | End: 2017-12-30

## 2017-12-30 RX ADMIN — IPRATROPIUM BROMIDE AND ALBUTEROL SULFATE 3 ML: .5; 3 SOLUTION RESPIRATORY (INHALATION) at 11:12

## 2017-12-30 RX ADMIN — PREDNISONE 60 MG: 20 TABLET ORAL at 12:12

## 2017-12-30 NOTE — DISCHARGE INSTRUCTIONS
Use allegra and flonase for symptomatic relief.      Follow up with Dr. Hair next week    Return to ED immediately if symptoms worsen

## 2017-12-30 NOTE — ED PROVIDER NOTES
"Encounter Date: 12/30/2017       History     Chief Complaint   Patient presents with    Cough     cough with yellow sputum x 1 weeks, had z pack and steroid shot on 12/25     68 y/o male with pmhx of ragweed allergy presents to ED for cough and wheezing since 12/23/17.  Pt states that he "gets this about 3 times a year.  It starts with this post-nasal drainage and then the cough starts.  It usually goes away with steroids."  Pt was in [place] when the cough began.  He was staying with family and "the whole family was sick.  The 2.5 year old had all kinds of drainage and I held him most of the trip."  Pt traveled home on 12/25/17.  He was seen at Urgent care on 12/26/17 and given a steroid shot and z-pack.  He completed his z-pack today.  Pt states that the cough has not improved and he continues to wheeze.  Pt denies chest pain, pressure, sob, weakness, ESTRADA, BLE edema.  Cough is intermittently productive cough of yellow sputum but mostly dry.  Pt states he needs to feel better because he travels again in a few days.  No known exposure to the flu.  No aggravating or improving factors.     Pt was prescribed a prednisone taper in Nov 2017 for similar symptoms. He states that he has had lung testing with is pcp and his lungs are normal.  He is a non-smoker          Review of patient's allergies indicates:   Allergen Reactions    Pollen extracts     Ragweed      Past Medical History:   Diagnosis Date    Allergy     Lipoma 12/19/2014     Past Surgical History:   Procedure Laterality Date    COLONOSCOPY N/A 12/21/2016    Procedure: COLONOSCOPY, excision of skin tag (Please schedule early in morning);  Surgeon: Nas Verdugo MD;  Location: [address];  Service: Endoscopy;  Laterality: N/A;    RHINOPLASTY TIP       Family History   Problem Relation Age of Onset    Hypertension Mother      Social History   Substance Use Topics    Smoking status: Never Smoker    Smokeless tobacco: Never Used    Alcohol use " No     Review of Systems   Constitutional: Negative for activity change, appetite change, chills, diaphoresis, fatigue and fever.   HENT: Negative for congestion and sore throat.         Post nasal drip   Eyes: Negative for discharge and redness.   Respiratory: Positive for cough and wheezing. Negative for chest tightness and shortness of breath.    Cardiovascular: Negative for chest pain and leg swelling.   Gastrointestinal: Negative for nausea and vomiting.   Musculoskeletal: Negative for myalgias.   Skin: Negative for pallor and rash.   Allergic/Immunologic: Negative for immunocompromised state.   Neurological: Negative for dizziness and weakness.   All other systems reviewed and are negative.      Physical Exam     Initial Vitals [12/30/17 0955]   BP Pulse Resp Temp SpO2   (!) 170/82 84 16 97.6 °F (36.4 °C) 96 %      MAP       111.33         Physical Exam    Nursing note and vitals reviewed.  Constitutional: He appears well-developed and well-nourished. He is not diaphoretic. No distress.   Very well appearing 68 y/o male, no distress.   HENT:   Head: Normocephalic and atraumatic.   Mouth/Throat: Oropharynx is clear and moist.   Eyes: Conjunctivae and EOM are normal. No scleral icterus.   Neck: Normal range of motion. Neck supple.   Cardiovascular: Normal rate, regular rhythm and normal heart sounds. Exam reveals no gallop and no friction rub.    No murmur heard.  Pulmonary/Chest: No respiratory distress. He has wheezes. He has no rhonchi. He has no rales. He exhibits no tenderness.   Abdominal: Soft. Bowel sounds are normal. He exhibits no distension. There is no tenderness.   Musculoskeletal: Normal range of motion. He exhibits no edema.   Lymphadenopathy:     He has no cervical adenopathy.   Neurological: He is alert and oriented to person, place, and time.   Skin: Skin is warm and dry. No pallor.   Psychiatric: He has a normal mood and affect. His behavior is normal.         ED Course   Procedures  Labs  "Reviewed   CBC W/ AUTO DIFFERENTIAL - Abnormal; Notable for the following:        Result Value    MCH 31.2 (*)     Eos # 0.6 (*)     All other components within normal limits   COMPREHENSIVE METABOLIC PANEL   B-TYPE NATRIURETIC PEPTIDE          X-Rays:   Independently Interpreted Readings:   Chest X-Ray: Normal heart size.  No infiltrates.  No acute abnormalities.     Medical Decision Making:   Initial Assessment:   66 y/o male with pmhx of ragweed allergy presents to ED for cough and wheezing since 12/23/17.  Pt states that he "gets this about 3 times a year.  It starts with this post-nasal drainage and then the cough starts.  It usually goes away with steroids."  Pt was in [place] when the cough began and traveled home on 12/25/17.  He was seen at Urgent care on 12/26/17 and given a steroid shot and z-pack.  He completed his z-pack today.  Pt states that the cough has not improved and he continues to wheeze.  Pt denies chest pain, pressure, sob, weakness, ESTRADA, BLE edema.  Cough is intermittently productive cough of yellow sputum but mostly dry.  Pt states he needs to feel better because he travels again in a few days.  No known exposure to the flu.  No aggravating or improving factors.           Differential Diagnosis:   DDX:  Allergic rhinitis, bronchitis, pneumonia, CHF, viral syndrome  Independently Interpreted Test(s):   I have ordered and independently interpreted X-rays - see prior notes.  Clinical Tests:   Lab Tests: Ordered and Reviewed  The following lab test(s) were unremarkable: CBC, CMP and BNP  Radiological Study: Ordered and Reviewed  ED Management:  Pt treated with duoneb and prednisone and feels better.  He remains well appearing with stable vitals.  Pt was prescribed a prednisone taper in Nov 2017 so I will not start outpatient steroids on this patient.  I have advised him to use flonase, allegra and albuterol inhaler as needed.  I feel his symptoms are related to viral bronchitis as he was exposed " to multiple family members with a respiratory virus and then traveled by plane for several hours.  He understands tx plan and will f/u with Dr. Hair next week. Strict return precautions discussed.     Pt counseled on their diagnosis and the importance of following up with PCP.  Pt also cautioned on when to return to ED.  Pt verbalizes understanding of discharge plan and will return to ED immediately if symptoms worsen                     ED Course      Clinical Impression:   The primary encounter diagnosis was Bronchitis. A diagnosis of Cough was also pertinent to this visit.    Disposition:   Disposition: Discharged  Condition: Stable                        Love Dooley MD  12/30/17 8444

## 2017-12-30 NOTE — ED NOTES
Pt here c/o continued productive cough-white and yellow fairly thick sputum. Skin WDL. Pt denies fatigue or weakness. denies N/V/D or urinary changes. Pt is AAOx3,resp are regular and unlabored. Pt with good cough effort. Breath sounds are w coarse crackles with late inspiratory wheeze throughout.

## 2018-01-05 ENCOUNTER — OFFICE VISIT (OUTPATIENT)
Dept: INTERNAL MEDICINE | Facility: CLINIC | Age: 68
End: 2018-01-05
Payer: MEDICARE

## 2018-01-05 VITALS
HEIGHT: 72 IN | BODY MASS INDEX: 25.83 KG/M2 | SYSTOLIC BLOOD PRESSURE: 130 MMHG | OXYGEN SATURATION: 97 % | HEART RATE: 69 BPM | DIASTOLIC BLOOD PRESSURE: 84 MMHG | WEIGHT: 190.69 LBS

## 2018-01-05 DIAGNOSIS — J06.9 VIRAL UPPER RESPIRATORY TRACT INFECTION: Primary | ICD-10-CM

## 2018-01-05 PROCEDURE — 99999 PR PBB SHADOW E&M-EST. PATIENT-LVL III: CPT | Mod: PBBFAC,,, | Performed by: INTERNAL MEDICINE

## 2018-01-05 PROCEDURE — 96372 THER/PROPH/DIAG INJ SC/IM: CPT | Mod: PBBFAC

## 2018-01-05 PROCEDURE — 99213 OFFICE O/P EST LOW 20 MIN: CPT | Mod: PBBFAC,25 | Performed by: INTERNAL MEDICINE

## 2018-01-05 PROCEDURE — 99213 OFFICE O/P EST LOW 20 MIN: CPT | Mod: S$PBB,,, | Performed by: INTERNAL MEDICINE

## 2018-01-05 RX ORDER — PREDNISONE 10 MG/1
TABLET ORAL
Qty: 18 TABLET | Refills: 0 | Status: SHIPPED | OUTPATIENT
Start: 2018-01-05 | End: 2018-04-03

## 2018-01-05 RX ORDER — BETAMETHASONE SODIUM PHOSPHATE AND BETAMETHASONE ACETATE 3; 3 MG/ML; MG/ML
6 INJECTION, SUSPENSION INTRA-ARTICULAR; INTRALESIONAL; INTRAMUSCULAR; SOFT TISSUE
Status: COMPLETED | OUTPATIENT
Start: 2018-01-05 | End: 2018-01-05

## 2018-01-05 RX ADMIN — BETAMETHASONE SODIUM PHOSPHATE AND BETAMETHASONE ACETATE 6 MG: 3; 3 INJECTION, SUSPENSION INTRA-ARTICULAR; INTRALESIONAL; INTRAMUSCULAR at 11:01

## 2018-01-05 NOTE — PROGRESS NOTES
CHIEF COMPLAINT:  Upper respiratory symptoms.    HISTORY OF PRESENT ILLNESS:  The patient is a 67-year-old gentleman who comes in   today with complaints of continued upper respiratory symptoms.  The patient   states that on Christmas, he was in [place].  He caught something from his family   members he was with, everybody at the house was sick, he developed a stuffy   head.  He did fly home.  When he returned home, his symptoms got worse.  He went   to the Emergency Room in [place].  He was given prednisone as well as a   breathing treatment and released and diagnosed with viral bronchitis.  He   reports he is currently 60% better, but is planning on flying out fairly soon.    He still has a cough, which bring up yellow sputum, which is not turning white.    He still has nasal congestion.  He does get a taste of acid secondary to   coughing.    PHYSICAL EXAMINATION:  GENERAL APPEARANCE:  No acute distress.  HEENT:  Conjunctivae was clear.  Pupils are equal and no photophobia.  TMs were   partially obscured by wax.  Nasal septum was midline without discharge.    Oropharynx was without erythema.  PULMONARY:  Good inspiratory, expiratory breath sounds were heard.  The patient   did have slight scattered wheezing on expiration.  CARDIOVASCULAR:  S1, S2.  EXTREMITIES:  Without edema.  ABDOMEN:  Nontender, nondistended, without hepatosplenomegaly.    The patient's labs were reviewed from [place].  His CBC on 12/30/2017 was normal   as well as a CMP and BNP.  A chest x-ray was also done at the time.  He had   bibasilar atelectasis.  There is no evidence of consolidation.    ASSESSMENT:  Upper respiratory infection, 60% improved.  The patient with   scattered wheezing.    PLAN:  We will give the patient a shot of Celestone today.  We will put him on a   prednisone taper.  It was also advised to start using Mucinex as directed.  He   is to call for any worsening of symptoms.      CONNIE/KATE  dd: 01/05/2018 11:04:50 (CST)  td:  01/06/2018 03:27:35 (CST)  Doc ID   #[id number]  Job ID #[id number]    CC:

## 2018-01-16 ENCOUNTER — TELEPHONE (OUTPATIENT)
Dept: SURGERY | Facility: CLINIC | Age: 68
End: 2018-01-16

## 2018-01-16 NOTE — TELEPHONE ENCOUNTER
----- Message from Sabrina Parnell sent at 1/16/2018  9:09 AM CST -----  Contact: patient  Patient needs to speak with the nurse about possibly being worked in to Dr Verdugo's schedule for this Friday 1/19/18 as he has Hemorrhoids that are bleeding.  Patient is out of town until Friday he did book for now with Dr Lee but he prefers Dr Verdugo.  Patient's # is [phone]

## 2018-01-24 ENCOUNTER — TELEPHONE (OUTPATIENT)
Dept: ENDOSCOPY | Facility: HOSPITAL | Age: 68
End: 2018-01-24

## 2018-01-24 ENCOUNTER — OFFICE VISIT (OUTPATIENT)
Dept: SURGERY | Facility: CLINIC | Age: 68
End: 2018-01-24
Payer: MEDICARE

## 2018-01-24 VITALS
HEART RATE: 86 BPM | DIASTOLIC BLOOD PRESSURE: 102 MMHG | BODY MASS INDEX: 25.98 KG/M2 | SYSTOLIC BLOOD PRESSURE: 166 MMHG | WEIGHT: 191.56 LBS

## 2018-01-24 DIAGNOSIS — Z12.11 COLON CANCER SCREENING: Primary | ICD-10-CM

## 2018-01-24 DIAGNOSIS — Z12.11 SPECIAL SCREENING FOR MALIGNANT NEOPLASMS, COLON: Primary | ICD-10-CM

## 2018-01-24 DIAGNOSIS — K64.4 SKIN TAG OF ANUS: ICD-10-CM

## 2018-01-24 PROCEDURE — 99999 PR PBB SHADOW E&M-EST. PATIENT-LVL III: CPT | Mod: PBBFAC,,, | Performed by: COLON & RECTAL SURGERY

## 2018-01-24 PROCEDURE — 99213 OFFICE O/P EST LOW 20 MIN: CPT | Mod: S$PBB,,, | Performed by: COLON & RECTAL SURGERY

## 2018-01-24 PROCEDURE — 99213 OFFICE O/P EST LOW 20 MIN: CPT | Mod: PBBFAC | Performed by: COLON & RECTAL SURGERY

## 2018-01-24 RX ORDER — SODIUM, POTASSIUM,MAG SULFATES 17.5-3.13G
1 SOLUTION, RECONSTITUTED, ORAL ORAL ONCE
Qty: 1 BOTTLE | Refills: 0 | Status: SHIPPED | OUTPATIENT
Start: 2018-01-24 | End: 2018-01-24

## 2018-01-24 RX ORDER — FLUTICASONE PROPIONATE 50 MCG
1 SPRAY, SUSPENSION (ML) NASAL DAILY
COMMUNITY
End: 2018-12-13

## 2018-01-24 NOTE — PROGRESS NOTES
Patient ID:  David Martin is a 67 y.o. male     Chief Complaint:   Chief Complaint   Patient presents with    Hemorrhoids        HPI Was scheduloed for c-scope previously but had family committemnts    :Long history of anal skin tags from hemorrhoids. Occasional bleeding. Has not had a colonoscopy  No family hx CR cancer      ROS:        Constitutional: No fever, chills, activity or appetite change.      HENT: No hearing loss, facial swelling, neck pain or stiffness.       Eyes: No discharge, itching and visual disturbance.      Respiratory: No apnea, cough, choking or shortness of breath.       Cardiovascular: No leg swelling or chest pain      Gastrointestinal: No abdominal distention or change in bowel habbits     Genitourinary: No dysuria, frequency or flank pain.      Musculoskeletal: No arthralgias or gait problem.      Neurological: No dizziness, seizures or weakness.      Hematological: No adenopathy.      Psychiatric/Behavioral: No hallucinations or behavioral problems.       PE:    APPEARANCE: Well nourished, well developed, in no acute distress.   CHEST: Lungs clear. Normal respiratory effort.  CARDIOVASCULAR: Normal rhythm. No edema.  ABDOMEN: Soft. No tenderness or masses.  Rectum:  Normal skin, NST, no masses or tenderness. Several skin tags biggest right anterior    Musculoskeletal: Symmetric, normal range of motion and strength.   Neurological: Alert and oriented to person, place, and time. Normal reflexes.   Skin: Skin is warm and dry.   Psychiatric: Normal mood and affect. Behavior is normal and appropriate.     Impression: Anal skin tags and need for colorectal cancer screening  PLAN: colonoscopy and excision of larger tag. possinble RBL.

## 2018-02-12 ENCOUNTER — TELEPHONE (OUTPATIENT)
Dept: ENDOSCOPY | Facility: HOSPITAL | Age: 68
End: 2018-02-12

## 2018-03-15 ENCOUNTER — OFFICE VISIT (OUTPATIENT)
Dept: OPTOMETRY | Facility: CLINIC | Age: 68
End: 2018-03-15

## 2018-03-15 DIAGNOSIS — Z46.0 FITTING AND ADJUSTMENT OF SPECTACLES AND CONTACT LENSES: Primary | ICD-10-CM

## 2018-03-15 PROCEDURE — 99499 UNLISTED E&M SERVICE: CPT | Mod: S$GLB,,, | Performed by: OPTOMETRIST

## 2018-03-15 NOTE — PROGRESS NOTES
HPI     Patient in for contact lens follow-up.  States that were experimenting   with contacts, current pair able to see too well reading not enough far.    Contact lenses patient currently wearing:  Bofinity      Any problems with Contact Lens comfort?  no    Contact lens wearing time (hours/per day): 5 hours    How long have Contact Lenses been in today?  no    Does patient sleep with the contact lenses in?  no          Last edited by Erlinda Nguyen MA on 3/15/2018 11:05 AM. (History)            Assessment /Plan     For exam results, see Encounter Report.    Fitting and adjustment of spectacles and contact lenses    pt having trouble with detailed distance vision    Order trials of XR with increased astig    OK to dispense to pt when arrive

## 2018-04-03 ENCOUNTER — OFFICE VISIT (OUTPATIENT)
Dept: URGENT CARE | Facility: CLINIC | Age: 68
End: 2018-04-03
Payer: MEDICARE

## 2018-04-03 VITALS
WEIGHT: 191 LBS | HEIGHT: 72 IN | RESPIRATION RATE: 18 BRPM | OXYGEN SATURATION: 100 % | DIASTOLIC BLOOD PRESSURE: 91 MMHG | TEMPERATURE: 98 F | SYSTOLIC BLOOD PRESSURE: 143 MMHG | BODY MASS INDEX: 25.87 KG/M2 | HEART RATE: 78 BPM

## 2018-04-03 DIAGNOSIS — R06.2 WHEEZING: Primary | ICD-10-CM

## 2018-04-03 DIAGNOSIS — R05.9 COUGH: ICD-10-CM

## 2018-04-03 DIAGNOSIS — B37.0 ORAL CANDIDIASIS: ICD-10-CM

## 2018-04-03 DIAGNOSIS — J06.9 UPPER RESPIRATORY TRACT INFECTION, UNSPECIFIED TYPE: ICD-10-CM

## 2018-04-03 PROCEDURE — 99213 OFFICE O/P EST LOW 20 MIN: CPT | Mod: S$GLB,,, | Performed by: NURSE PRACTITIONER

## 2018-04-03 PROCEDURE — 94640 AIRWAY INHALATION TREATMENT: CPT | Mod: S$GLB,,, | Performed by: FAMILY MEDICINE

## 2018-04-03 RX ORDER — ALBUTEROL SULFATE 90 UG/1
1-2 AEROSOL, METERED RESPIRATORY (INHALATION) EVERY 6 HOURS PRN
Qty: 1 INHALER | Refills: 1 | Status: SHIPPED | OUTPATIENT
Start: 2018-04-03 | End: 2018-06-30 | Stop reason: SDUPTHER

## 2018-04-03 RX ORDER — IPRATROPIUM BROMIDE 0.5 MG/2.5ML
0.5 SOLUTION RESPIRATORY (INHALATION)
Status: COMPLETED | OUTPATIENT
Start: 2018-04-03 | End: 2018-04-03

## 2018-04-03 RX ORDER — CODEINE PHOSPHATE AND GUAIFENESIN 10; 100 MG/5ML; MG/5ML
5 SOLUTION ORAL NIGHTLY PRN
Qty: 118 ML | Refills: 0 | Status: SHIPPED | OUTPATIENT
Start: 2018-04-03 | End: 2018-06-12 | Stop reason: SDUPTHER

## 2018-04-03 RX ORDER — NYSTATIN 100000 [USP'U]/ML
4 SUSPENSION ORAL 4 TIMES DAILY
Qty: 112 ML | Refills: 0 | Status: SHIPPED | OUTPATIENT
Start: 2018-04-03 | End: 2018-04-10

## 2018-04-03 RX ORDER — FLUCONAZOLE 100 MG/1
200 TABLET ORAL ONCE
Qty: 2 TABLET | Refills: 0 | Status: SHIPPED | OUTPATIENT
Start: 2018-04-03 | End: 2018-04-03

## 2018-04-03 RX ORDER — HYDROCODONE POLISTIREX AND CHLORPHENIRAMINE POLISTIREX 10; 8 MG/5ML; MG/5ML
5 SUSPENSION, EXTENDED RELEASE ORAL EVERY 12 HOURS PRN
Qty: 115 ML | Refills: 0 | Status: SHIPPED | OUTPATIENT
Start: 2018-04-03 | End: 2018-04-03 | Stop reason: CLARIF

## 2018-04-03 RX ORDER — ALBUTEROL SULFATE 90 UG/1
1-2 AEROSOL, METERED RESPIRATORY (INHALATION) EVERY 6 HOURS PRN
Qty: 1 INHALER | Refills: 1 | Status: SHIPPED | OUTPATIENT
Start: 2018-04-03 | End: 2018-04-03 | Stop reason: SDUPTHER

## 2018-04-03 RX ORDER — PREDNISONE 10 MG/1
TABLET ORAL
Qty: 18 TABLET | Refills: 0 | Status: SHIPPED | OUTPATIENT
Start: 2018-04-03 | End: 2018-06-12 | Stop reason: SDUPTHER

## 2018-04-03 RX ORDER — ALBUTEROL SULFATE 0.83 MG/ML
2.5 SOLUTION RESPIRATORY (INHALATION)
Status: COMPLETED | OUTPATIENT
Start: 2018-04-03 | End: 2018-04-03

## 2018-04-03 RX ADMIN — ALBUTEROL SULFATE 2.5 MG: 0.83 SOLUTION RESPIRATORY (INHALATION) at 04:04

## 2018-04-03 RX ADMIN — IPRATROPIUM BROMIDE 0.5 MG: 0.5 SOLUTION RESPIRATORY (INHALATION) at 04:04

## 2018-04-03 NOTE — PROGRESS NOTES
Subjective:       Patient ID: David Martin is a 67 y.o. male.    Vitals:    04/03/18 1612 04/03/18 1652   BP: (!) 143/91    Pulse: 99 78   Resp: 18    Temp: 98 °F (36.7 °C)    SpO2: 98% 100%   Weight: 86.6 kg (191 lb)    Height: 6' (1.829 m)        Chief Complaint: Sinus Problem (4 days )    Pt is leaving for [place] for 3 weeks in 1 week. Pt says he has been tested for COPD and asthma at pulmonologist and he doesn't have either      Sinus Problem   This is a new problem. The current episode started in the past 7 days. The problem is unchanged. There has been no fever. His pain is at a severity of 2/10. The pain is mild. Associated symptoms include congestion, coughing, headaches, sinus pressure and sneezing. Pertinent negatives include no chills, ear pain, hoarse voice, shortness of breath or sore throat. Past treatments include acetaminophen and spray decongestants. The treatment provided mild relief.     Review of Systems   Constitution: Negative for chills, fever and malaise/fatigue.   HENT: Positive for congestion, sinus pressure and sneezing. Negative for ear pain, hoarse voice and sore throat.    Eyes: Negative for discharge and redness.   Cardiovascular: Negative for chest pain, dyspnea on exertion and leg swelling.   Respiratory: Positive for cough, sputum production and wheezing. Negative for shortness of breath.    Musculoskeletal: Negative for myalgias.   Gastrointestinal: Negative for abdominal pain and nausea.   Neurological: Positive for headaches.       Objective:      Physical Exam   Constitutional: He is oriented to person, place, and time. He appears well-developed and well-nourished. He is cooperative.  Non-toxic appearance. He does not appear ill. No distress.   HENT:   Head: Normocephalic and atraumatic.   Right Ear: Hearing, tympanic membrane, external ear and ear canal normal.   Left Ear: Hearing, tympanic membrane, external ear and ear canal normal.   Nose: Nose normal. No mucosal edema,  rhinorrhea or nasal deformity. No epistaxis. Right sinus exhibits no maxillary sinus tenderness and no frontal sinus tenderness. Left sinus exhibits no maxillary sinus tenderness and no frontal sinus tenderness.   Mouth/Throat: Uvula is midline, oropharynx is clear and moist and mucous membranes are normal. No trismus in the jaw. Normal dentition. No uvula swelling. No posterior oropharyngeal erythema.       White patch observed to circled area.   Eyes: Conjunctivae and lids are normal. Pupils are equal, round, and reactive to light. No scleral icterus.   Sclera clear bilat   Neck: Trachea normal, full passive range of motion without pain and phonation normal. Neck supple.   Cardiovascular: Normal rate, regular rhythm, normal heart sounds, intact distal pulses and normal pulses.    Pulmonary/Chest: Effort normal. No respiratory distress. He has no decreased breath sounds. He has wheezes (moderate, expiratory ) in the right upper field, the right middle field, the right lower field, the left upper field, the left middle field and the left lower field. He has no rhonchi. He has no rales.   Post dyuo neb treatment exp wheezing still present but improved and pt was moving better air. Pt reports feeling better   Abdominal: Soft. Normal appearance and bowel sounds are normal. He exhibits no distension. There is no tenderness.   Musculoskeletal: Normal range of motion. He exhibits no edema or deformity.   Neurological: He is alert and oriented to person, place, and time. He exhibits normal muscle tone. Coordination normal.   Skin: Skin is warm, dry and intact. He is not diaphoretic. No pallor.   Psychiatric: He has a normal mood and affect. His speech is normal and behavior is normal. Judgment and thought content normal. Cognition and memory are normal.   Nursing note and vitals reviewed.      Assessment:       1. Wheezing    2. Upper respiratory tract infection, unspecified type    3. Oral candidiasis    4. Cough         Plan:       David was seen today for sinus problem.    Diagnoses and all orders for this visit:    Wheezing  -     albuterol nebulizer solution 2.5 mg; Take 3 mLs (2.5 mg total) by nebulization one time.  -     ipratropium 0.02 % nebulizer solution 0.5 mg; Take 2.5 mLs (0.5 mg total) by nebulization one time.  -     Discontinue: albuterol 90 mcg/actuation inhaler; Inhale 1-2 puffs into the lungs every 6 (six) hours as needed for Wheezing. Rescue  -     predniSONE (DELTASONE) 10 MG tablet; Take 3 tablets daily for 3 days then 2 tablets daily for 3 days then 1 tablet daily for 3 days.  -     albuterol 90 mcg/actuation inhaler; Inhale 1-2 puffs into the lungs every 6 (six) hours as needed for Wheezing. Rescue    Upper respiratory tract infection, unspecified type    Oral candidiasis  -     fluconazole (DIFLUCAN) 100 MG tablet; Take 2 tablets (200 mg total) by mouth once.  -     nystatin (MYCOSTATIN) 100,000 unit/mL suspension; Take 4 mLs (400,000 Units total) by mouth 4 (four) times daily.    Cough  -     Discontinue: hydrocodone-chlorpheniramine (TUSSIONEX) 10-8 mg/5 mL suspension; Take 5 mLs by mouth every 12 (twelve) hours as needed for Cough.  -     codeine-guaifenesin 6.3-100 mg/5 mL Liqd; Take 5 mLs by mouth nightly as needed.    Pt told to follow up with PCP for recurrent URI with wheezing and cough      5:17 PM  Pt came back with tussionex script asking for a different medication due to price. Prescription put in shredder    5:34 PM  Pt is back from pharmacy again trying to get cheaper cough medicine. Old prescription put in shredder

## 2018-04-03 NOTE — PATIENT INSTRUCTIONS
I  Viral Upper Respiratory Illness with Wheezing (Adult)  You have a viral upper respiratory illness (URI), which is another term for the common cold. When the infection causes a lot of irritation, the air passages can go into spasm. This causes wheezing and shortness of breath.    This illness is contagious during the first few days. It is spread through the air by coughing and sneezing. It may also be spread by direct contact (touching the sick person and then touching your own eyes, nose, or mouth). Frequent handwashing will decrease the risk.  Most viral illnesses go away within 7 to 10 days with rest and simple home remedies. Sometimes the illness may last for several weeks. Antibiotics will not kill a virus, and they are generally not prescribed for this condition.  Home care  · If symptoms are severe, rest at home for the first 2 to 3 days. When you resume activity, don't let yourself get too tired.  · Avoid being exposed to cigarette smoke (yours or others).  · You may use acetaminophen or ibuprofen to control pain and fever, unless another medicine was prescribed. (Note: If you have chronic liver or kidney disease, have ever had a stomach ulcer or gastrointestinal bleeding, or are taking blood-thinning medicines, talk with your healthcare provider before using these medicines.) Aspirin should never be given to anyone under 18 years of age who is ill with a viral infection or fever. It may cause severe liver or brain damage.  · Your appetite may be poor, so a light diet is fine. Avoid dehydration by drinking 6 to 8 glasses of fluids per day (water, soft drinks, juices, tea, or soup). Extra fluids will help loosen secretions in the nose and lungs.  · Over-the-counter cold medicines will not shorten the length of time youre sick, but they may be helpful for the following symptoms: cough, sore throat, and nasal and sinus congestion. (Note: Do not use decongestants if you have high blood  pressure.)  Follow-up care  Follow up with your healthcare provider, or as advised.  When to seek medical advice  Call your healthcare provider right away if any of these occur:  · Cough with lots of colored sputum (mucus)  · Severe headache; face, neck, or ear pain  · Difficulty swallowing due to throat pain  · Fever of 100.4ºF (38ºC) or higher, or as directed by your healthcare provider  Call 911, or get immediate medical care  Call emergency services right away if any of these occur:  · Chest pain, shortness of breath, worsening wheezing, or difficulty breathing  · Coughing up blood  · Inability to swallow due to throat pain  Date Last Reviewed: 9/13/2015  © 8165-9496 [employer]. [address] [place] [address], [place] [address]. All rights reserved. This information is not intended as a substitute for professional medical care. Always follow your healthcare professional's instructions.            Candida Infection: Thrush  Thrush is a type of fungal infection in the mouth and throat. Thrush does not usually affect healthy adults. It is more common in people with a weakened immune system. It is also more likely if you take antibiotics. Thrush is normally not contagious.  Understanding fungus in the mouth and throat  Your mouth and throat normally contain millions of tiny organisms. These include bacteria and yeasts. Many of these do not cause any problems. In fact, they may help fight disease.  Yeasts are a type of fungus. A type of yeast called Candida normally lives on your skin. It is also found on the membranes of your mouth and throat. Usually, this yeast grows only in small amounts and is harmless. But in some cases, Candida can grow out of control and cause thrush. Thrush is related to other kinds of Candida infections that can grow all over the body. Thrush refers to an infection of only the mouth and throat.  What causes thrush?  Thrush happens when something lets too much Candida grow inside your  mouth and throat. Certain things that change the normal balance of organisms in the mouth can lead to thrush. One example is antibiotic medicine. This medicine may kill some of the normal bacteria in your mouth. Candida can then grow freely. People on antibiotics have an increased risk for thrush.  You have a higher risk for thrush if you:  · Wear dentures  · Are getting chemotherapy  · Are getting radiation therapy  · Have diabetes  · Have a transplanted organ  · Use corticosteroids  · Have a weakened immune system, such as from AIDS  · Are an older adult  Symptoms of thrush  Some people with thrush do not have any symptoms. Symptoms of thrush can include:  · A dry, cottony feeling in your mouth  · Loss of taste  · Pain while eating or swallowing  · White or red patches inside your mouth or on the back of your throat  Diagnosing thrush  Your health care provider will ask about your medical history and your symptoms. He or she will look closely at your mouth and throat. White or red patches will be scraped with a tongue depressor. The sample will be sent to a lab to test. This test can usually confirm thrush.  If you have thrush, you may also have esophageal candidiasis. This is common in people who have HIV. Your health care provider may check for this condition with an upper endoscopy. This is a procedure to look at the esophagus. A tissue sample may be taken to test.  Treatment for thrush  It is important to treat thrush early. Untreated, it may turn into a more serious form of widespread infection. Thrush is usually treated with antifungal medicine. The medicine is put directly in your mouth and throat. You may be given a swish and swallow medicine or an antifungal lozenge.  In some cases, you may need an antifungal pill. This can remove Candida throughout your body. Or you may need medicine through an IV. These treatments depend on how severe your infection is, and what other health conditions you have.  If  you are at high risk for thrush, you may need to keep taking oral antifungal medicine. This is to help prevent thrush in the future.  What happens if you dont get treated for thrush?  If untreated, the Candida may spread throughout your body. They may even enter your bloodstream. This can cause serious problems, such as organ failure and even death. Bloodstream infection may need to be treated with high doses of antifungal medicine through an IV.  Systemic infection is much more likely in people who are very ill. It is also more common in those who have serious problems with their immune system. Additional risk factors for systemic infection in very ill people include:  · Central venous lines  · IV nutrition  · Broad-spectrum antibiotics  · Kidney failure  · Recent surgery  Preventing thrush  You may be able to help prevent some cases of thrush. Make sure to:  · Practice good oral hygiene. Try using a chlorhexidine mouthwash.  · Clean your dentures regularly as instructed. Make sure they fit you correctly.  · After using a corticosteroid inhaler, rinse out your mouth with water or mouthwash.  · Do not use broad-spectrum antibiotics, if possible.  · Get treated for health problems that increase your risk for thrush, such as diabetes.     When to call the health care provider  Call your health care provider right away if you have any of these:  · Cottony feeling in your mouth  · Loss of taste  · Pain while eating or swallowing  · White or red patches inside your mouth or on the back of your throat   Date Last Reviewed: 3/19/2015  © 4330-1923 [employer]. [address] [place] [address], [place] [address]. All rights reserved. This information is not intended as a substitute for professional medical care. Always follow your healthcare professional's instructions.

## 2018-04-10 ENCOUNTER — TELEPHONE (OUTPATIENT)
Dept: OPTOMETRY | Facility: CLINIC | Age: 68
End: 2018-04-10

## 2018-04-10 NOTE — TELEPHONE ENCOUNTER
Spoke with pt informed sample contacts are in states that he was out of town will be something around Thursday when he comes to pick them up.

## 2018-04-12 ENCOUNTER — TELEPHONE (OUTPATIENT)
Dept: URGENT CARE | Facility: CLINIC | Age: 68
End: 2018-04-12

## 2018-04-12 NOTE — TELEPHONE ENCOUNTER
Pt called stating that his thrush was 75% gone and wanted to know if he needed more nystatin. I spoke with Dr. Ceja, and he instructed me to refill pt's Rx. Notified pt that I would be calling in the RX

## 2018-06-12 ENCOUNTER — OFFICE VISIT (OUTPATIENT)
Dept: INTERNAL MEDICINE | Facility: CLINIC | Age: 68
End: 2018-06-12
Payer: MEDICARE

## 2018-06-12 ENCOUNTER — TELEPHONE (OUTPATIENT)
Dept: INTERNAL MEDICINE | Facility: CLINIC | Age: 68
End: 2018-06-12

## 2018-06-12 VITALS
TEMPERATURE: 98 F | DIASTOLIC BLOOD PRESSURE: 88 MMHG | SYSTOLIC BLOOD PRESSURE: 158 MMHG | OXYGEN SATURATION: 97 % | HEIGHT: 72 IN | WEIGHT: 198.19 LBS | HEART RATE: 68 BPM | BODY MASS INDEX: 26.84 KG/M2

## 2018-06-12 DIAGNOSIS — R05.9 COUGH: ICD-10-CM

## 2018-06-12 DIAGNOSIS — R06.2 WHEEZING: ICD-10-CM

## 2018-06-12 PROCEDURE — 99999 PR PBB SHADOW E&M-EST. PATIENT-LVL IV: CPT | Mod: PBBFAC,,, | Performed by: INTERNAL MEDICINE

## 2018-06-12 PROCEDURE — 99214 OFFICE O/P EST MOD 30 MIN: CPT | Mod: PBBFAC | Performed by: INTERNAL MEDICINE

## 2018-06-12 PROCEDURE — 99213 OFFICE O/P EST LOW 20 MIN: CPT | Mod: S$PBB,,, | Performed by: INTERNAL MEDICINE

## 2018-06-12 RX ORDER — PREDNISONE 10 MG/1
TABLET ORAL
Qty: 18 TABLET | Refills: 1 | Status: SHIPPED | OUTPATIENT
Start: 2018-06-12 | End: 2018-12-13 | Stop reason: ALTCHOICE

## 2018-06-12 RX ORDER — MONTELUKAST SODIUM 10 MG/1
10 TABLET ORAL NIGHTLY
Qty: 30 TABLET | Refills: 1 | Status: SHIPPED | OUTPATIENT
Start: 2018-06-12 | End: 2018-06-12 | Stop reason: SDUPTHER

## 2018-06-12 RX ORDER — MONTELUKAST SODIUM 10 MG/1
TABLET ORAL
Qty: 90 TABLET | Refills: 1 | Status: SHIPPED | OUTPATIENT
Start: 2018-06-12 | End: 2018-12-13

## 2018-06-12 RX ORDER — CODEINE PHOSPHATE AND GUAIFENESIN 10; 100 MG/5ML; MG/5ML
5 SOLUTION ORAL NIGHTLY PRN
Qty: 118 ML | Refills: 0 | Status: SHIPPED | OUTPATIENT
Start: 2018-06-12 | End: 2018-12-13

## 2018-06-12 NOTE — TELEPHONE ENCOUNTER
----- Message from Nadya Tejedaam sent at 6/12/2018  6:26 AM CDT -----  Contact: self  Appointment Request From: David Martin    With Provider: Alexandro Hair MD [-Primary Care Physician-]    Would Accept With:Only the person I've selected    Preferred Date Range: Any date 6/11/2018 or later    Preferred Times: Any    Reason for visit: Request an Appt    Comments:  Need to get the reoccurring wheezing remedied.  It I'd definitely linked to congestion so I'm not sure where you might direct me

## 2018-06-12 NOTE — PROGRESS NOTES
Subjective:       Patient ID: David Martin is a 67 y.o. male.    Chief Complaint: Cough (Wheezing)    Cough   This is a new problem. The current episode started in the past 7 days. The problem has been unchanged. The problem occurs every few minutes. The cough is non-productive. Associated symptoms include wheezing. Pertinent negatives include no chest pain, fever, headaches, postnasal drip, sore throat or shortness of breath. Nothing (patient thinks it is allergic in origin) aggravates the symptoms. Treatments tried: flonase. The treatment provided no relief. patient says he has asthma     Review of Systems   Constitutional: Negative for activity change, appetite change and fever.   HENT: Negative for congestion, postnasal drip and sore throat.    Respiratory: Positive for cough and wheezing. Negative for shortness of breath.    Cardiovascular: Negative for chest pain and palpitations.   Gastrointestinal: Negative for abdominal pain, blood in stool, constipation, diarrhea, nausea and vomiting.   Genitourinary: Negative for decreased urine volume, difficulty urinating, flank pain and frequency.   Musculoskeletal: Negative for arthralgias.   Neurological: Negative for dizziness, weakness and headaches.       Objective:      Physical Exam   Constitutional: He is oriented to person, place, and time. He appears well-developed and well-nourished. No distress.   HENT:   Head: Normocephalic and atraumatic.   Right Ear: External ear normal.   Left Ear: External ear normal.   Eyes: Conjunctivae and EOM are normal. Pupils are equal, round, and reactive to light.   Neck: Normal range of motion. Neck supple. No thyromegaly present.   Cardiovascular: Normal rate and regular rhythm.    Pulmonary/Chest: Effort normal. He has decreased breath sounds in the right middle field, the right lower field, the left middle field and the left lower field. He has wheezes in the right middle field, the right lower field, the left middle  field and the left lower field.           Abdominal: Soft. Bowel sounds are normal. He exhibits no mass. There is no tenderness. There is no rebound and no guarding.   Musculoskeletal: Normal range of motion.   Lymphadenopathy:     He has no cervical adenopathy.   Neurological: He is alert and oriented to person, place, and time. He has normal reflexes. He displays normal reflexes. No cranial nerve deficit. He exhibits normal muscle tone. Coordination normal.   Skin: Skin is warm and dry.       Assessment:       1. Wheezing    2. Cough        Plan:   David was seen today for cough.    Diagnoses and all orders for this visit:    Wheezing  -     Ambulatory consult to Pulmonology  -     predniSONE (DELTASONE) 10 MG tablet; Take 3 tablets daily for 3 days then 2 tablets daily for 3 days then 1 tablet daily for 3 days.    Cough  -     guaifenesin-codeine 100-10 mg/5 ml (VIRTUSSIN AC)  mg/5 mL syrup; Take 5 mLs by mouth nightly as needed for Cough.    Other orders  -     Discontinue: montelukast (SINGULAIR) 10 mg tablet; Take 1 tablet (10 mg total) by mouth every evening.

## 2018-06-30 DIAGNOSIS — R06.2 WHEEZING: ICD-10-CM

## 2018-07-02 RX ORDER — ALBUTEROL SULFATE 90 UG/1
AEROSOL, METERED RESPIRATORY (INHALATION)
Qty: 18 G | Refills: 0 | Status: SHIPPED | OUTPATIENT
Start: 2018-07-02 | End: 2018-12-13

## 2018-07-09 NOTE — TELEPHONE ENCOUNTER
Unable to reach patient.  Will await call back.   no back pain, no gout, no musculoskeletal pain, no neck pain, and no weakness.

## 2018-07-17 ENCOUNTER — HOSPITAL ENCOUNTER (OUTPATIENT)
Dept: RADIOLOGY | Facility: HOSPITAL | Age: 68
Discharge: HOME | End: 2018-07-17
Attending: INTERNAL MEDICINE
Payer: MEDICARE

## 2018-07-17 ENCOUNTER — HOSPITAL ENCOUNTER (OUTPATIENT)
Dept: PULMONOLOGY | Facility: CLINIC | Age: 68
Discharge: HOME | End: 2018-07-17
Payer: MEDICARE

## 2018-07-17 ENCOUNTER — OFFICE VISIT (OUTPATIENT)
Dept: PULMONOLOGY | Facility: CLINIC | Age: 68
End: 2018-07-17
Payer: MEDICARE

## 2018-07-17 VITALS
WEIGHT: 197.75 LBS | HEIGHT: 72 IN | BODY MASS INDEX: 26.78 KG/M2 | DIASTOLIC BLOOD PRESSURE: 84 MMHG | RESPIRATION RATE: 12 BRPM | OXYGEN SATURATION: 94 % | HEART RATE: 60 BPM | SYSTOLIC BLOOD PRESSURE: 132 MMHG

## 2018-07-17 DIAGNOSIS — J30.1 SEASONAL ALLERGIC RHINITIS DUE TO POLLEN: ICD-10-CM

## 2018-07-17 DIAGNOSIS — R06.2 WHEEZING: ICD-10-CM

## 2018-07-17 LAB
POST FEV1 FVC: 0.73
POST FEV1: 2.82
POST FVC: 3.86
PRE FEV1 FVC: 66
PRE FEV1: 2.4
PRE FVC: 3.63
PREDICTED FEV1 FVC: 78
PREDICTED FEV1: 3.67
PREDICTED FVC: 4.61

## 2018-07-17 PROCEDURE — 99214 OFFICE O/P EST MOD 30 MIN: CPT | Mod: PBBFAC,25 | Performed by: INTERNAL MEDICINE

## 2018-07-17 PROCEDURE — 99999 PR PBB SHADOW E&M-EST. PATIENT-LVL IV: CPT | Mod: PBBFAC,,, | Performed by: INTERNAL MEDICINE

## 2018-07-17 PROCEDURE — 71046 X-RAY EXAM CHEST 2 VIEWS: CPT | Mod: 26,,, | Performed by: RADIOLOGY

## 2018-07-17 PROCEDURE — 71046 X-RAY EXAM CHEST 2 VIEWS: CPT | Mod: TC

## 2018-07-17 PROCEDURE — 94060 EVALUATION OF WHEEZING: CPT | Mod: PBBFAC | Performed by: INTERNAL MEDICINE

## 2018-07-17 PROCEDURE — 70210 X-RAY EXAM OF SINUSES: CPT | Mod: TC

## 2018-07-17 PROCEDURE — 70210 X-RAY EXAM OF SINUSES: CPT | Mod: 26,,, | Performed by: RADIOLOGY

## 2018-07-17 PROCEDURE — 99204 OFFICE O/P NEW MOD 45 MIN: CPT | Mod: 25,S$PBB,, | Performed by: INTERNAL MEDICINE

## 2018-07-17 RX ORDER — BUDESONIDE AND FORMOTEROL FUMARATE DIHYDRATE 160; 4.5 UG/1; UG/1
2 AEROSOL RESPIRATORY (INHALATION) EVERY 12 HOURS
Qty: 10.2 G | Refills: 6 | Status: SHIPPED | OUTPATIENT
Start: 2018-07-17 | End: 2019-06-28

## 2018-07-17 NOTE — PROGRESS NOTES
Subjective:       Patient ID: David Martin is a 67 y.o. male.    Chief Complaint: Cough and Wheezing    HPI HISTORY OF PRESENT ILLNESS:  Mr. Martin is a 67-year-old nonsmoker, who comes   for help with recurrent respiratory symptoms of cough and wheezing.  He   describes having bouts of initial nasal and sinus congestion, which then seemed   to lead to cough and wheezing.  He has generally been able to control these   symptoms only with a short course of prednisone.  He states that he feels well   for a period of several weeks and then these symptoms recur.  He has used   albuterol with some temporary benefit for control of wheezing.  He has not had   any associated fever or sinus pain.  He has minimal nonpurulent sputum   associated with the cough.    Mr. Martin has been treated for allergic rhinitis since his mid 30s.  He   recalls being on immunotherapy treatment for one to two years while living in   [place], a number of years ago.  More recently, he had an evaluation in the   Allergy Clinic here.  Studies done at that time were largely negative.    Nevertheless, he is certain that he has had a positive allergic response to ragweed   and other pollens in the past.    Mr. Martin had an evaluation [facility], and there   was note of possible laryngopharyngeal reflux.  He has never taken medications   for treatment of this diagnosis.  He does not recognize any ongoing   typical acid symptoms.    DATA:  I reviewed the images from a chest x-ray done in December.  The cardiac   silhouette is not enlarged.  There is bibasilar atelectasis.  Elsewhere, the   lungs appeared clear.  Mr. Martin states that this x-ray was done at a time   when he was having an acute exacerbation of respiratory symptoms.    A spirometry study done in June 2016 shows normal baseline values and no change   on a postbronchodilator study.      CB/HN  dd: 07/17/2018 20:57:48 (CDT)  td: 07/18/2018 09:46:11 (CDT)   Doc ID   #[id number]  Job ID #[id number]    CC:       Review of Systems   Constitutional: Negative for fever and fatigue.   HENT: Positive for postnasal drip and congestion. Negative for sinus pressure and voice change.    Respiratory: Positive for cough, sputum production and wheezing. Negative for shortness of breath and dyspnea on extertion.    Cardiovascular: Negative for chest pain and leg swelling.   Genitourinary: Negative for difficulty urinating.   Musculoskeletal: Negative for arthralgias and back pain.   Skin: Negative for rash.   Gastrointestinal: Negative for abdominal pain and acid reflux.   Neurological: Negative for dizziness and weakness.   Hematological: Negative for adenopathy.       Objective:      Physical Exam   Constitutional: He is oriented to person, place, and time. He appears well-developed and well-nourished.   HENT:   Head: Normocephalic.   Mouth/Throat: Oropharynx is clear and moist. No oropharyngeal exudate.   Neck: Normal range of motion. Neck supple. No JVD present. No tracheal deviation present. No thyromegaly present.   Cardiovascular: Normal rate, regular rhythm and normal heart sounds.    No murmur heard.  Pulmonary/Chest: Normal expansion. No stridor. He has wheezes (bilateral whezes with somewhat decreased air exchange). He has no rhonchi. He has no rales. He exhibits no tenderness.   Abdominal: Soft.   Musculoskeletal: He exhibits no edema.   Lymphadenopathy:     He has no cervical adenopathy.   Neurological: He is alert and oriented to person, place, and time.   Skin: Skin is warm and dry. No rash noted. No erythema. Nails show no clubbing.   Psychiatric: He has a normal mood and affect.   Vitals reviewed.    Personal Diagnostic Review    No flowsheet data found.      Assessment:       1. Seasonal allergic rhinitis due to pollen    2. Wheezing        Outpatient Encounter Prescriptions as of 7/17/2018   Medication Sig Dispense Refill    budesonide-formoterol 160-4.5 mcg (SYMBICORT)  160-4.5 mcg/actuation HFAA Inhale 2 puffs into the lungs every 12 (twelve) hours. 1 Inhaler 6    fluticasone (FLONASE) 50 mcg/actuation nasal spray 1 spray by Each Nare route once daily.      guaifenesin-codeine 100-10 mg/5 ml (VIRTUSSIN AC)  mg/5 mL syrup Take 5 mLs by mouth nightly as needed for Cough. 118 mL 0    montelukast (SINGULAIR) 10 mg tablet TAKE 1 TABLET BY MOUTH EVERY EVENING 90 tablet 1    predniSONE (DELTASONE) 10 MG tablet Take 3 tablets daily for 3 days then 2 tablets daily for 3 days then 1 tablet daily for 3 days. 18 tablet 1    VENTOLIN HFA 90 mcg/actuation inhaler INHALE 2 PUFFS INTO THE LUNGS EVERY 6 HOURS AS NEEDED FOR WHEEZING 18 g 0    zolpidem (AMBIEN) 10 mg Tab        No facility-administered encounter medications on file as of 7/17/2018.      Orders Placed This Encounter   Procedures    X-Ray Chest PA And Lateral     Standing Status:   Future     Number of Occurrences:   1     Standing Expiration Date:   7/17/2019    X-Ray Sinuses 1 view Alex     Standing Status:   Future     Number of Occurrences:   1     Standing Expiration Date:   7/17/2019    IgE     Standing Status:   Future     Standing Expiration Date:   7/17/2019    CBC auto differential     Standing Status:   Future     Standing Expiration Date:   7/17/2019    Spirometry with/without bronchodilator     Standing Status:   Future     Number of Occurrences:   1     Standing Expiration Date:   7/17/2019     Plan:     CBC, IgE, CXR, Sinus Xray, and Pre/Post Spirometry (phone report).  Begin trial with Symbicort (instructed).  Continue Flonase and Singulair.  Use Albuterol if needed.

## 2018-07-17 NOTE — PATIENT INSTRUCTIONS
CBC, IgE, CXR, Sinus Xray, and Pre/Post Spirometry (phone report).  Begin trial with Symbicort (instructed).  Continue Flonase and Singulair.  Use Albuterol if needed.

## 2018-07-17 NOTE — LETTER
July 17, 2018      Nicole Conn MD  [address]  [facility] [address]           [facility]  [address]  [facility] [address]  Phone: [phone]          Patient: David Martin   MR Number: [id number]   YOB: 1950   Date of Visit: 7/17/2018       Dear Dr. Nicole Conn:    Thank you for referring David Martin to me for evaluation. Attached you will find relevant portions of my assessment and plan of care.    If you have questions, please do not hesitate to call me. I look forward to following David Martin along with you.    Sincerely,    ELVA Samaniego MD    Enclosure  CC:  No Recipients    If you would like to receive this communication electronically, please contact [email or web address] or [phone] to request more information on [employer] Link access.    For providers and/or their staff who would like to refer a patient to [facility], please contact us through our one-stop-shop provider referral line, [facility] , at [phone].    If you feel you have received this communication in error or would no longer like to receive these types of communications, please e-mail [email or web address]

## 2018-07-23 ENCOUNTER — LAB VISIT (OUTPATIENT)
Dept: LAB | Facility: HOSPITAL | Age: 68
End: 2018-07-23
Attending: INTERNAL MEDICINE
Payer: MEDICARE

## 2018-07-23 DIAGNOSIS — J30.1 SEASONAL ALLERGIC RHINITIS DUE TO POLLEN: ICD-10-CM

## 2018-07-23 LAB
BASOPHILS # BLD AUTO: 0.07 K/UL
BASOPHILS NFR BLD: 1 %
DIFFERENTIAL METHOD: ABNORMAL
EOSINOPHIL # BLD AUTO: 0.7 K/UL
EOSINOPHIL NFR BLD: 10.2 %
ERYTHROCYTE [DISTWIDTH] IN BLOOD BY AUTOMATED COUNT: 13.7 %
HCT VFR BLD AUTO: 44.2 %
HGB BLD-MCNC: 15.2 G/DL
IGE SERPL-ACNC: <35 IU/ML
LYMPHOCYTES # BLD AUTO: 1.8 K/UL
LYMPHOCYTES NFR BLD: 24.6 %
MCH RBC QN AUTO: 30.6 PG
MCHC RBC AUTO-ENTMCNC: 34.4 G/DL
MCV RBC AUTO: 89 FL
MONOCYTES # BLD AUTO: 0.5 K/UL
MONOCYTES NFR BLD: 7.4 %
NEUTROPHILS # BLD AUTO: 4.1 K/UL
NEUTROPHILS NFR BLD: 56.7 %
PLATELET # BLD AUTO: 282 K/UL
PMV BLD AUTO: 10.3 FL
RBC # BLD AUTO: 4.96 M/UL
WBC # BLD AUTO: 7.16 K/UL

## 2018-07-23 PROCEDURE — 36415 COLL VENOUS BLD VENIPUNCTURE: CPT

## 2018-07-23 PROCEDURE — 82785 ASSAY OF IGE: CPT

## 2018-07-23 PROCEDURE — 85025 COMPLETE CBC W/AUTO DIFF WBC: CPT

## 2018-07-26 ENCOUNTER — PATIENT MESSAGE (OUTPATIENT)
Dept: PULMONOLOGY | Facility: CLINIC | Age: 68
End: 2018-07-26

## 2018-08-20 ENCOUNTER — TELEPHONE (OUTPATIENT)
Dept: ENDOSCOPY | Facility: HOSPITAL | Age: 68
End: 2018-08-20

## 2018-12-13 ENCOUNTER — PATIENT MESSAGE (OUTPATIENT)
Dept: OTOLARYNGOLOGY | Facility: CLINIC | Age: 68
End: 2018-12-13

## 2018-12-13 ENCOUNTER — OFFICE VISIT (OUTPATIENT)
Dept: OTOLARYNGOLOGY | Facility: CLINIC | Age: 68
End: 2018-12-13
Payer: MEDICARE

## 2018-12-13 VITALS
HEIGHT: 72 IN | WEIGHT: 198.44 LBS | BODY MASS INDEX: 26.88 KG/M2 | HEART RATE: 65 BPM | DIASTOLIC BLOOD PRESSURE: 94 MMHG | SYSTOLIC BLOOD PRESSURE: 154 MMHG

## 2018-12-13 DIAGNOSIS — J34.2 DEVIATED NASAL SEPTUM: ICD-10-CM

## 2018-12-13 DIAGNOSIS — J33.9 NASAL POLYPOSIS: ICD-10-CM

## 2018-12-13 DIAGNOSIS — H61.23 IMPACTED CERUMEN OF BOTH EARS: ICD-10-CM

## 2018-12-13 DIAGNOSIS — J31.0 CHRONIC RHINITIS: Primary | ICD-10-CM

## 2018-12-13 DIAGNOSIS — J45.30 MILD PERSISTENT ASTHMA WITHOUT COMPLICATION: ICD-10-CM

## 2018-12-13 DIAGNOSIS — J32.4 CHRONIC PANSINUSITIS: ICD-10-CM

## 2018-12-13 PROCEDURE — 99999 PR PBB SHADOW E&M-EST. PATIENT-LVL III: CPT | Mod: PBBFAC,,, | Performed by: OTOLARYNGOLOGY

## 2018-12-13 PROCEDURE — 31231 NASAL ENDOSCOPY DX: CPT | Mod: PBBFAC | Performed by: OTOLARYNGOLOGY

## 2018-12-13 PROCEDURE — 99214 OFFICE O/P EST MOD 30 MIN: CPT | Mod: 25,S$PBB,, | Performed by: OTOLARYNGOLOGY

## 2018-12-13 PROCEDURE — 99213 OFFICE O/P EST LOW 20 MIN: CPT | Mod: PBBFAC | Performed by: OTOLARYNGOLOGY

## 2018-12-13 RX ORDER — DOXYCYCLINE 100 MG/1
100 CAPSULE ORAL EVERY 12 HOURS
Qty: 42 CAPSULE | Refills: 0 | Status: SHIPPED | OUTPATIENT
Start: 2018-12-13 | End: 2019-01-03

## 2018-12-13 NOTE — PROCEDURES
Nasal/sinus endoscopy  Date/Time: 12/13/2018 11:46 AM  Performed by: Rashad Goetz MD  Authorized by: Rashad Goetz MD     Consent Done?:  Yes (Verbal)  Anesthesia:     Local anesthetic:  Lidocaine 4% and Cade-Synephrine 1/2%    Patient tolerance:  Patient tolerated the procedure well with no immediate complications  Nose:     Procedure Performed:  Nasal Endoscopy  External:      No external nasal deformity  Intranasal:      Mucosa polyps     Mucosa ulcers not present     No mucosa lesions present     Enlarged turbinates     Septum gross deformity  Nasopharynx:      No mucosa lesions     Adenoids not present     Posterior choanae patent     Eustachian tube patent     Grade 2-3 polyps bilaterally.  Rightward septal deviation (residual).  No marion purulence.

## 2018-12-13 NOTE — PROGRESS NOTES
Subjective:      David Martin is a 68 y.o. male who was self-referred for nasal congestion.    He relates a very long history of sinus trouble, primarily related to allergies and postnasal drip, which was always associated with coughing and minor wheezing.  About 6 months ago he saw Dr. Samanieog who prescribed Symbicort and within 2 days of starting that the wheezing resolved.  However, shortly thereafter he developed constant, daily, bilateral nasal blockage, which is moderately severe and bilateral.  He notes possible hyposmia, but denies rhinorrhea or facial pressure.  He describes aural fullness especially when flying, and has had some painful otalgia as well.  He now notes popping in the ears at rest, and is scheduled to see Dr. Grove next week for an ear cleaning.  He cannot recall the last sinus infection or antibiotics.  He previously used Veramyst with good relief, but now that this is OTC he has tried Sensimist and regular Flonase and neither seems to help much.    Current sinonasal medications as above.  He does regularly use nasal decongestant sprays such as Afrin for relief.    He recalls previously having allergy testing that was positive for ragweed in the distant past, which resulted in a course of immunotherapy.    He relates a history of asthma which is currently managed with Symbicort.  He denies sensitivity to NSAIDs.    He relates a history of reflux symptoms that are very mild and are not managed with medication.  He has not previously had an EGD though he did see Dr. Rodriguez who ruled out LPR.    He denies have a diagnosis of obstructive sleep apnea.     He has previously had sinonasal surgery consisting of septoplasty about 20 years ago.    He does not recall a prior history of nasal trauma other than the sinonasal surgery.    SNOT-22 score = 35, NOSE score = 90%, ETDQ-7 score = 2.7    Global QOL = 80%    Days missed = Less than 5      Past Medical History  He has a past medical history of  Allergy and Lipoma.    Past Surgical History  He has a past surgical history that includes Rhinoplasty tip and COLONOSCOPY, excision of skin tag (Please schedule early in morning) (N/A, 12/21/2016).    Family History  His family history includes Hypertension in his mother; No Known Problems in his father.    Social History  He reports that  has never smoked. he has never used smokeless tobacco. He reports that he does not drink alcohol or use drugs.    Allergies  He is allergic to pollen extracts and ragweed.    Medications   He has a current medication list which includes the following prescription(s): budesonide-formoterol 160-4.5 mcg, doxycycline, and zolpidem.    Review of Systems  Review of Systems   Constitutional: Negative for fatigue, fever and unexpected weight change.   HENT: Positive for postnasal drip, rhinorrhea and sore throat. Negative for congestion, dental problem, ear discharge, ear pain, facial swelling, hearing loss, hoarse voice, nosebleeds, sinus pressure, tinnitus and trouble swallowing.    Eyes: Positive for discharge. Negative for photophobia, itching and visual disturbance.   Respiratory: Negative for apnea, cough, shortness of breath and wheezing.    Cardiovascular: Negative for chest pain and palpitations.   Gastrointestinal: Negative for abdominal pain, nausea and vomiting.   Endocrine: Negative for cold intolerance and heat intolerance.   Genitourinary: Negative for difficulty urinating.   Musculoskeletal: Negative for arthralgias, back pain, myalgias and neck pain.   Skin: Negative for rash.   Allergic/Immunologic: Positive for environmental allergies. Negative for food allergies.   Neurological: Positive for headaches. Negative for dizziness, seizures, syncope and weakness.   Hematological: Negative for adenopathy. Does not bruise/bleed easily.   Psychiatric/Behavioral: Negative for decreased concentration, dysphoric mood and sleep disturbance. The patient is not nervous/anxious.            Objective:     BP (!) 154/94   Pulse 65   Ht 6' (1.829 m)   Wt 90 kg (198 lb 6.6 oz)   BMI 26.91 kg/m²        Constitutional:   He appears well-developed. He is cooperative. Normal speech.  No hoarse voice.      Head:  Normocephalic. Salivary glands normal.  Facial strength is normal.      Ears:    Right Ear: No tenderness. No decreased hearing is noted.   Left Ear: No tenderness. No decreased hearing is noted.   Near-complete EAC blockage with cerumen impaction.    Nose:  Mucosal edema present. No rhinorrhea, septal deviation or polyps. No epistaxis. Turbinates normal, no turbinate masses and no turbinate hypertrophy.  Right sinus exhibits no maxillary sinus tenderness and no frontal sinus tenderness. Left sinus exhibits no maxillary sinus tenderness and no frontal sinus tenderness.     Mouth/Throat  Oropharynx clear and moist without lesions or asymmetry and normal uvula midline. He does not have dentures. Normal dentition. No oral lesions or mucous membrane lesions. No oropharyngeal exudate or posterior oropharyngeal erythema. Tonsils present, +3.  Mirror exam not performed due to patient tolerance.  Mirror exam not performed due to patient tolerance.      Neck:  Neck normal without thyromegaly masses, asymmetry, normal tracheal structure, crepitus, and tenderness, thyroid normal, trachea normal and no adenopathy. Normal range of motion present.     He has no cervical adenopathy.     Cardiovascular:   Regular rhythm.      Pulmonary/Chest:   Effort normal.     Psychiatric:   He has a normal mood and affect. His speech is normal and behavior is normal.     Neurological:   No cranial nerve deficit.     Skin:   No rash noted.       Procedure    Nasal endoscopy performed.  See procedure note.     Left nasal valve     Left MT with polyps     Left SE recess polyps     Left ET     Right nasal valve     Right MT obscured by grade 3 polyp     Right septal spur and polyp        Data Reviewed    WBC (K/uL)    Date Value   07/23/2018 7.16     Eosinophil% (%)   Date Value   07/23/2018 10.2 (H)     Eos # (K/uL)   Date Value   07/23/2018 0.7 (H)     Platelets (K/uL)   Date Value   07/23/2018 282     Glucose (mg/dL)   Date Value   12/30/2017 102     IgE (IU/mL)   Date Value   07/23/2018 <35       No sinus imaging available.       Assessment:     1. Chronic rhinitis    2. Chronic pansinusitis    3. Nasal polyposis    4. Deviated nasal septum    5. Impacted cerumen of both ears         Plan:     I had a long discussion with the patient regarding his condition and the further workup and management options.  I prescribed a therapeutic course of doxycycline for 21 days.  I prescribed the daily use of Flonase Sensimist to treat sinonasal inflammation with a preferential delivery mechanism for the posterior nasal cavity and nasopharynx.  If not improved, I ordered a CT scan of the sinuses to assess for intraluminal obstruction.  I briefly mentioned that surgical management may be considered.    Follow-up for test results.

## 2018-12-18 ENCOUNTER — OFFICE VISIT (OUTPATIENT)
Dept: OTOLARYNGOLOGY | Facility: CLINIC | Age: 68
End: 2018-12-18
Payer: MEDICARE

## 2018-12-18 VITALS
BODY MASS INDEX: 26.91 KG/M2 | WEIGHT: 198.44 LBS | HEART RATE: 76 BPM | DIASTOLIC BLOOD PRESSURE: 92 MMHG | SYSTOLIC BLOOD PRESSURE: 144 MMHG

## 2018-12-18 DIAGNOSIS — H93.8X3 EAR FULLNESS, BILATERAL: Primary | ICD-10-CM

## 2018-12-18 PROCEDURE — 99213 OFFICE O/P EST LOW 20 MIN: CPT | Mod: 25,S$PBB,, | Performed by: OTOLARYNGOLOGY

## 2018-12-18 PROCEDURE — 99213 OFFICE O/P EST LOW 20 MIN: CPT | Mod: PBBFAC | Performed by: OTOLARYNGOLOGY

## 2018-12-18 PROCEDURE — 69210 REMOVE IMPACTED EAR WAX UNI: CPT | Mod: 50,PBBFAC | Performed by: OTOLARYNGOLOGY

## 2018-12-18 PROCEDURE — 69210 REMOVE IMPACTED EAR WAX UNI: CPT | Mod: S$PBB,,, | Performed by: OTOLARYNGOLOGY

## 2018-12-18 PROCEDURE — 99999 PR PBB SHADOW E&M-EST. PATIENT-LVL III: CPT | Mod: PBBFAC,,, | Performed by: OTOLARYNGOLOGY

## 2018-12-18 NOTE — PROGRESS NOTES
Subjective:       Patient ID: David Martin is a 68 y.o. male.    Chief Complaint: Ear Fullness    Ear Fullness    There is pain in both ears. This is a recurrent problem. The current episode started 1 to 4 weeks ago. The problem occurs constantly. There has been no fever. The patient is experiencing no pain. Associated symptoms include hearing loss. Pertinent negatives include no coughing, diarrhea, ear discharge, headaches, neck pain, rash, rhinorrhea or vomiting. He has tried nothing for the symptoms. His past medical history is significant for hearing loss.     Review of Systems   Constitutional: Negative for activity change, appetite change and fever.   HENT: Positive for hearing loss. Negative for congestion, ear discharge, ear pain, nosebleeds, postnasal drip, rhinorrhea and sneezing.    Eyes: Negative for redness and visual disturbance.   Respiratory: Negative for apnea, cough, shortness of breath and wheezing.    Cardiovascular: Negative for chest pain and palpitations.   Gastrointestinal: Negative for diarrhea and vomiting.   Genitourinary: Negative for difficulty urinating and frequency.   Musculoskeletal: Negative for arthralgias, back pain, gait problem and neck pain.   Skin: Negative for color change and rash.   Neurological: Negative for dizziness, speech difficulty, weakness and headaches.   Hematological: Negative for adenopathy. Does not bruise/bleed easily.   Psychiatric/Behavioral: Negative for agitation and behavioral problems.       Objective:        Constitutional:   Vital signs are normal. He appears well-developed and well-nourished. He is active. Normal speech.      Head:  Normocephalic and atraumatic. Salivary glands normal.  Facial strength is normal.      Nose:  Nose normal including turbinates, nasal mucosa, sinuses and nasal septum.     Mouth/Throat  Oropharynx clear and moist without lesions or asymmetry and normal uvula midline.     Neck:  Neck normal without thyromegaly masses,  asymmetry, normal tracheal structure, crepitus, and tenderness, thyroid normal, trachea normal, phonation normal and full range of motion with neck supple.     Psychiatric:   He has a normal mood and affect. His speech is normal and behavior is normal.     Neurological:   He has neurological normal, alert and oriented.     Skin:   No abrasions, lacerations, lesions, or rashes.         PROCEDURE NOTE:  Ceruminosis is noted in both EACs.  Wax was removed by manual debridement and suctioning utilizing the assistance of binocular microscopy revealing EACs and TMs WNL. The patient tolerated this procedure without difficulty. The subjective decrease noted in hearing pre-cleaning was resolved post-cleaning.       Assessment:       1. Ear fullness, bilateral        Plan:       1. Hearing conservation strongly recommended.  2. Trial of amplification is not currently indicated.  3. Re-check of hearing after 69 years of age.  4. F/U with PCP as per schedule.

## 2019-02-05 ENCOUNTER — OFFICE VISIT (OUTPATIENT)
Dept: OTOLARYNGOLOGY | Facility: CLINIC | Age: 69
End: 2019-02-05
Payer: MEDICARE

## 2019-02-05 VITALS
DIASTOLIC BLOOD PRESSURE: 92 MMHG | BODY MASS INDEX: 26.31 KG/M2 | HEART RATE: 108 BPM | WEIGHT: 194.25 LBS | SYSTOLIC BLOOD PRESSURE: 143 MMHG | HEIGHT: 72 IN

## 2019-02-05 DIAGNOSIS — J34.3 NASAL TURBINATE HYPERTROPHY: ICD-10-CM

## 2019-02-05 DIAGNOSIS — J45.30 MILD PERSISTENT ASTHMA WITHOUT COMPLICATION: ICD-10-CM

## 2019-02-05 DIAGNOSIS — J34.2 DEVIATED NASAL SEPTUM: ICD-10-CM

## 2019-02-05 DIAGNOSIS — J31.0 NONALLERGIC RHINITIS: Primary | ICD-10-CM

## 2019-02-05 DIAGNOSIS — J32.4 CHRONIC PANSINUSITIS: ICD-10-CM

## 2019-02-05 DIAGNOSIS — J33.9 NASAL POLYPOSIS: ICD-10-CM

## 2019-02-05 PROCEDURE — 99214 PR OFFICE/OUTPT VISIT, EST, LEVL IV, 30-39 MIN: ICD-10-PCS | Mod: S$PBB,,, | Performed by: OTOLARYNGOLOGY

## 2019-02-05 PROCEDURE — 99999 PR PBB SHADOW E&M-EST. PATIENT-LVL III: CPT | Mod: PBBFAC,,, | Performed by: OTOLARYNGOLOGY

## 2019-02-05 PROCEDURE — 99214 OFFICE O/P EST MOD 30 MIN: CPT | Mod: S$PBB,,, | Performed by: OTOLARYNGOLOGY

## 2019-02-05 PROCEDURE — 99999 PR PBB SHADOW E&M-EST. PATIENT-LVL III: ICD-10-PCS | Mod: PBBFAC,,, | Performed by: OTOLARYNGOLOGY

## 2019-02-05 PROCEDURE — 99213 OFFICE O/P EST LOW 20 MIN: CPT | Mod: PBBFAC | Performed by: OTOLARYNGOLOGY

## 2019-02-05 NOTE — PROGRESS NOTES
Subjective:      David is a 68 y.o. male who comes for follow-up of sinusitis.  His last visit with me was on 12/13/2018.  Finished doxycycline and using Veramyst, slight improvements but now worsening over past week, thick postnasal drip, marked bilateral congestion and blockage, hyposmia.    SNOT-22 score = 55, NOSE score = 95%, ETDQ-7 score = 1.6    The patient's medications, allergies, past medical, surgical, social and family histories were reviewed and updated as appropriate.    A detailed review of systems was obtained with pertinent positives as per the above HPI, and otherwise negative.        Objective:     BP (!) 143/92   Pulse 108   Ht 6' (1.829 m)   Wt 88.1 kg (194 lb 3.6 oz)   BMI 26.34 kg/m²        Constitutional:   He appears well-developed. He is cooperative.     Head:  Normocephalic.     Nose:  Rhinorrhea, septal deviation and polyps present. No mucosal edema. No epistaxis. Turbinate hypertrophy.  Turbinates normal and no turbinate masses.  Right sinus exhibits no maxillary sinus tenderness and no frontal sinus tenderness. Left sinus exhibits no maxillary sinus tenderness and no frontal sinus tenderness.   Increased size of right-sided polyps.    Mouth/Throat  Oropharynx clear and moist without lesions or asymmetry. No oropharyngeal exudate or posterior oropharyngeal erythema.     Neck:  No adenopathy. Normal range of motion present.     He has no cervical adenopathy.       Procedure    None        Data Reviewed    WBC (K/uL)   Date Value   07/23/2018 7.16     Eosinophil% (%)   Date Value   07/23/2018 10.2 (H)     Eos # (K/uL)   Date Value   07/23/2018 0.7 (H)     Platelets (K/uL)   Date Value   07/23/2018 282     Glucose (mg/dL)   Date Value   12/30/2017 102     IgE (IU/mL)   Date Value   07/23/2018 <35       No sinus imaging available.      Assessment:     1. Nonallergic rhinitis    2. Chronic pansinusitis    3. Nasal polyposis    4. Deviated nasal septum    5. Nasal turbinate hypertrophy     6. Mild persistent asthma without complication         Plan:     He would benefit from endoscopic sinus surgery and septoplasty for the treatment of his condition.  This would include all sinuses and would be bilateral.  Inferior turbinate reduction would be included.  I discussed the risks, benefits and alternatives to surgery with the patient, as well as the expected postoperative course.  I gave him the opportunity to ask questions and I answered all of them.  I provided relevant printed information on his condition for him to review at home.  Same-day discharge is anticipated.  He may have anesthesia triage by telephone. He will also need a CT sinuses with Stealth protocol prior to surgery.  The surgery will be scheduled in the near future.  This will be dependent on his travel schedule.  He will need to return for a postoperative visit 1 week after surgery.  Follow-up for surgery.

## 2019-02-07 ENCOUNTER — HOSPITAL ENCOUNTER (OUTPATIENT)
Dept: RADIOLOGY | Facility: HOSPITAL | Age: 69
Discharge: HOME | End: 2019-02-07
Attending: OTOLARYNGOLOGY
Payer: MEDICARE

## 2019-02-07 DIAGNOSIS — J33.9 NASAL POLYPOSIS: ICD-10-CM

## 2019-02-07 PROCEDURE — 70486 CT MEDTRONIC SINUSES WITHOUT: ICD-10-PCS | Mod: 26,,, | Performed by: RADIOLOGY

## 2019-02-07 PROCEDURE — 70486 CT MAXILLOFACIAL W/O DYE: CPT | Mod: 26,,, | Performed by: RADIOLOGY

## 2019-02-07 PROCEDURE — 70486 CT MAXILLOFACIAL W/O DYE: CPT | Mod: TC

## 2019-02-08 ENCOUNTER — PATIENT MESSAGE (OUTPATIENT)
Dept: OTOLARYNGOLOGY | Facility: CLINIC | Age: 69
End: 2019-02-08

## 2019-05-24 DIAGNOSIS — R06.2 WHEEZING: ICD-10-CM

## 2019-05-24 RX ORDER — BUDESONIDE AND FORMOTEROL FUMARATE DIHYDRATE 160; 4.5 UG/1; UG/1
2 AEROSOL RESPIRATORY (INHALATION) EVERY 12 HOURS
Qty: 10.2 G | Refills: 6 | Status: CANCELLED | OUTPATIENT
Start: 2019-05-24

## 2019-05-28 DIAGNOSIS — J30.1 SEASONAL ALLERGIC RHINITIS DUE TO POLLEN: Primary | ICD-10-CM

## 2019-05-28 DIAGNOSIS — R06.2 WHEEZING: ICD-10-CM

## 2019-05-29 RX ORDER — BUDESONIDE AND FORMOTEROL FUMARATE DIHYDRATE 160; 4.5 UG/1; UG/1
2 AEROSOL RESPIRATORY (INHALATION) EVERY 12 HOURS
Qty: 10.2 G | Refills: 11 | Status: CANCELLED | OUTPATIENT
Start: 2019-05-29

## 2019-06-28 DIAGNOSIS — J30.1 SEASONAL ALLERGIC RHINITIS DUE TO POLLEN: Primary | ICD-10-CM

## 2019-06-28 DIAGNOSIS — R06.2 WHEEZING: ICD-10-CM

## 2019-06-30 RX ORDER — BUDESONIDE AND FORMOTEROL FUMARATE DIHYDRATE 160; 4.5 UG/1; UG/1
2 AEROSOL RESPIRATORY (INHALATION) EVERY 12 HOURS
Qty: 10.2 G | Refills: 6 | Status: SHIPPED | OUTPATIENT
Start: 2019-06-30 | End: 2020-07-11

## 2019-10-21 ENCOUNTER — TELEPHONE (OUTPATIENT)
Dept: CARDIOLOGY | Facility: CLINIC | Age: 69
End: 2019-10-21

## 2019-10-21 ENCOUNTER — PATIENT OUTREACH (OUTPATIENT)
Dept: ADMINISTRATIVE | Facility: OTHER | Age: 69
End: 2019-10-21

## 2019-10-21 ENCOUNTER — OFFICE VISIT (OUTPATIENT)
Dept: CARDIOLOGY | Facility: CLINIC | Age: 69
End: 2019-10-21
Payer: MEDICARE

## 2019-10-21 VITALS
HEIGHT: 72 IN | WEIGHT: 187.38 LBS | BODY MASS INDEX: 25.38 KG/M2 | HEART RATE: 72 BPM | OXYGEN SATURATION: 98 % | SYSTOLIC BLOOD PRESSURE: 157 MMHG | DIASTOLIC BLOOD PRESSURE: 91 MMHG

## 2019-10-21 DIAGNOSIS — R00.2 PALPITATIONS: ICD-10-CM

## 2019-10-21 DIAGNOSIS — E78.00 HYPERCHOLESTEREMIA: ICD-10-CM

## 2019-10-21 DIAGNOSIS — I10 ESSENTIAL HYPERTENSION: Primary | ICD-10-CM

## 2019-10-21 DIAGNOSIS — Z12.11 ENCOUNTER FOR FIT (FECAL IMMUNOCHEMICAL TEST) SCREENING: Primary | ICD-10-CM

## 2019-10-21 DIAGNOSIS — R00.2 PALPITATIONS: Primary | ICD-10-CM

## 2019-10-21 DIAGNOSIS — R00.0 TACHYCARDIA: ICD-10-CM

## 2019-10-21 DIAGNOSIS — Z91.89 AT RISK FOR CORONARY ARTERY DISEASE: ICD-10-CM

## 2019-10-21 PROCEDURE — 99204 OFFICE O/P NEW MOD 45 MIN: CPT | Mod: S$PBB,,, | Performed by: INTERNAL MEDICINE

## 2019-10-21 PROCEDURE — 99214 OFFICE O/P EST MOD 30 MIN: CPT | Mod: PBBFAC | Performed by: INTERNAL MEDICINE

## 2019-10-21 PROCEDURE — 93010 ELECTROCARDIOGRAM REPORT: CPT | Mod: S$PBB,,, | Performed by: INTERNAL MEDICINE

## 2019-10-21 PROCEDURE — 99204 PR OFFICE/OUTPT VISIT, NEW, LEVL IV, 45-59 MIN: ICD-10-PCS | Mod: S$PBB,,, | Performed by: INTERNAL MEDICINE

## 2019-10-21 PROCEDURE — 93010 EKG 12-LEAD: ICD-10-PCS | Mod: S$PBB,,, | Performed by: INTERNAL MEDICINE

## 2019-10-21 PROCEDURE — 99999 PR PBB SHADOW E&M-EST. PATIENT-LVL IV: CPT | Mod: PBBFAC,,, | Performed by: INTERNAL MEDICINE

## 2019-10-21 PROCEDURE — 99999 PR PBB SHADOW E&M-EST. PATIENT-LVL IV: ICD-10-PCS | Mod: PBBFAC,,, | Performed by: INTERNAL MEDICINE

## 2019-10-21 PROCEDURE — 93005 ELECTROCARDIOGRAM TRACING: CPT | Mod: PBBFAC | Performed by: INTERNAL MEDICINE

## 2019-10-21 RX ORDER — LOSARTAN POTASSIUM 25 MG/1
25 TABLET ORAL DAILY
Qty: 90 TABLET | Refills: 3 | Status: SHIPPED | OUTPATIENT
Start: 2019-10-21 | End: 2022-12-19

## 2019-10-21 NOTE — LETTER
October 21, 2019      Alexandro Hair MD  [address]  [facility] [address]           [facility] - Cardiology  [address]  [facility] [address]  Phone: [phone]          Patient: David Martin   MR Number: [id number]   YOB: 1950   Date of Visit: 10/21/2019       Dear Dr. Alexandro Hair:    Thank you for referring David Martin to me for evaluation. Attached you will find relevant portions of my assessment and plan of care.    If you have questions, please do not hesitate to call me. I look forward to following David Martin along with you.    Sincerely,    Catherine Cooper MD    Enclosure  CC:  No Recipients    If you would like to receive this communication electronically, please contact [email or web address] or [phone] to request more information on [facility] Link access.    For providers and/or their staff who would like to refer a patient to [facility], please contact us through our one-stop-shop provider referral line, [facility], at [phone].    If you feel you have received this communication in error or would no longer like to receive these types of communications, please e-mail [email or web address]

## 2019-10-21 NOTE — PROGRESS NOTES
Subjective:   Patient ID:  David Martin is a 69 y.o. male is a new patient who presents for evaluation of Consult and Palpitations  for help with recurrent respiratory symptoms of cough and wheezing.  He   describes having bouts of initial nasal and sinus congestion, which then seemed   to lead to cough and wheezing.  He has generally been able to control these   symptoms only with a short course of prednisone.  He states that he feels well   for a period of several weeks and then these symptoms recur.  He has used   albuterol with some temporary benefit for control of wheezing.  He has not had   any associated fever or sinus pain.  He has minimal nonpurulent sputum   associated with the cough    Stress Echo 2017  CONCLUSIONS     1 - Normal left ventricular systolic function (EF 60-65%).     2 - Normal left ventricular diastolic function.     3 - Normal right ventricular systolic function .     No evidence of stress induced myocardial ischemia.     HPI:   Patient was sitting and noticed that his HR was 101 bpm and lasted 30 min, patient is asymptomatic.  Patient is taking Symbicort and flonase   Mother had MI in 80s  Non smoker  Patient walks fast 2-3 miles and goes to the gym, plays golf as well.     The 10-year ASCVD risk score (Sandi DC Jr., et al., 2013) is: 29%    Values used to calculate the score:      Age: 69 years      Sex: Male      Is Non- : No      Diabetic: No      Tobacco smoker: No      Systolic Blood Pressure: 157 mmHg      Is BP treated: No      HDL Cholesterol: 42 mg/dL      Total Cholesterol: 241 mg/dL      Patient Active Problem List   Diagnosis    Lipoma    Elevated blood pressure    Chronic rhinitis    Dysphonia    Postnasal drip    Skin tag of anus    Colon cancer screening    Impacted cerumen of both ears    Throat clearing    Laryngopharyngeal reflux    Insect bite    Seasonal allergic rhinitis due to pollen    Wheezing     BP (!) 157/91 (BP Location:  Left arm, Patient Position: Sitting, BP Method: Medium (Automatic))   Pulse 72   Ht 6' (1.829 m)   Wt 85 kg (187 lb 6.3 oz)   SpO2 98%   BMI 25.41 kg/m²   Body mass index is 25.41 kg/m².  CrCl cannot be calculated (Patient's most recent lab result is older than the maximum 7 days allowed.).    Lab Results   Component Value Date     12/30/2017    K 3.9 12/30/2017     12/30/2017    CO2 29 12/30/2017    BUN 16 12/30/2017    CREATININE 1.0 12/30/2017     12/30/2017    MG 1.9 02/21/2017    AST 16 12/30/2017    ALT 20 12/30/2017    ALBUMIN 3.9 12/30/2017    PROT 7.3 12/30/2017    BILITOT 0.6 12/30/2017    WBC 7.16 07/23/2018    HGB 15.2 07/23/2018    HCT 44.2 07/23/2018    MCV 89 07/23/2018     07/23/2018    PSA 1.1 12/22/2014    TSH 1.643 12/22/2014    CHOL 241 (H) 03/16/2017    HDL 42 03/16/2017    LDLCALC 174.8 (H) 03/16/2017    TRIG 121 03/16/2017       Current Outpatient Medications   Medication Sig    budesonide-formoterol 160-4.5 mcg (SYMBICORT) 160-4.5 mcg/actuation HFAA Inhale 2 puffs into the lungs every 12 (twelve) hours. (Patient not taking: Reported on 10/21/2019)    losartan (COZAAR) 25 MG tablet Take 1 tablet (25 mg total) by mouth once daily.     No current facility-administered medications for this visit.        Review of Systems   Constitution: Negative for chills, decreased appetite, malaise/fatigue, night sweats, weight gain and weight loss.   Eyes: Negative for blurred vision, double vision, visual disturbance and visual halos.   Cardiovascular: Negative for chest pain, claudication, cyanosis, dyspnea on exertion, irregular heartbeat, leg swelling, near-syncope, orthopnea, palpitations, paroxysmal nocturnal dyspnea and syncope.   Respiratory: Negative for cough, hemoptysis, snoring, sputum production and wheezing.    Endocrine: Negative for cold intolerance, heat intolerance, polydipsia and polyphagia.   Hematologic/Lymphatic: Negative for adenopathy and bleeding  problem. Does not bruise/bleed easily.   Skin: Negative for flushing, itching, poor wound healing and rash.   Musculoskeletal: Negative for arthritis, back pain, falls, gout, joint pain, joint swelling, muscle cramps, muscle weakness, myalgias, neck pain and stiffness.   Gastrointestinal: Negative for bloating, abdominal pain, anorexia, diarrhea, dysphagia, excessive appetite, flatus, hematemesis, jaundice, melena and nausea.   Genitourinary: Negative for hesitancy and incomplete emptying.   Neurological: Negative for aphonia, brief paralysis, difficulty with concentration, disturbances in coordination, excessive daytime sleepiness, dizziness, focal weakness, light-headedness, loss of balance and weakness.   Psychiatric/Behavioral: Negative for altered mental status, depression, hallucinations, hypervigilance, memory loss, substance abuse and suicidal ideas. The patient does not have insomnia and is not nervous/anxious.        Objective:   Physical Exam   Constitutional: He is oriented to person, place, and time. He appears well-developed and well-nourished. No distress.   HENT:   Head: Normocephalic and atraumatic.   Nose: Nose normal.   Mouth/Throat: No oropharyngeal exudate.   Eyes: Pupils are equal, round, and reactive to light. Conjunctivae and EOM are normal. Right eye exhibits no discharge. Left eye exhibits no discharge. No scleral icterus.   Neck: Normal range of motion. Neck supple. No JVD present. No tracheal deviation present. No thyromegaly present.   Cardiovascular: Normal rate, regular rhythm, normal heart sounds and intact distal pulses. Exam reveals no gallop and no friction rub.   No murmur heard.  Pulmonary/Chest: Effort normal and breath sounds normal. No stridor. No respiratory distress. He has no wheezes. He has no rales. He exhibits no tenderness.   Abdominal: Soft. Bowel sounds are normal. He exhibits no distension and no mass. There is no tenderness.   Musculoskeletal: He exhibits no edema  or tenderness.   Lymphadenopathy:     He has no cervical adenopathy.   Neurological: He is alert and oriented to person, place, and time. He displays normal reflexes. No cranial nerve deficit. He exhibits normal muscle tone. Coordination normal.   Skin: Skin is warm. No rash noted. He is not diaphoretic. No erythema. No pallor.   Psychiatric: He has a normal mood and affect. His behavior is normal. Judgment and thought content normal.       Assessment:     1. Essential hypertension    2. Tachycardia    3. Hypercholesteremia    4. At risk for coronary artery disease        Plan:   We discussed his risk of heart disease with high BP, high cholesterol and family history  David was seen today for consult and palpitations.    Diagnoses and all orders for this visit:    Essential hypertension  -     Comprehensive metabolic panel; Future  -     Lipid panel; Future    Tachycardia    Hypercholesteremia  -     Lipid panel; Future    At risk for coronary artery disease  -     CT Calcium Scoring Cardiac; Future    Other orders  -     losartan (COZAAR) 25 MG tablet; Take 1 tablet (25 mg total) by mouth once daily.      RTC 8 wks.

## 2019-10-28 ENCOUNTER — PES CALL (OUTPATIENT)
Dept: ADMINISTRATIVE | Facility: CLINIC | Age: 69
End: 2019-10-28

## 2019-11-04 ENCOUNTER — LAB VISIT (OUTPATIENT)
Dept: LAB | Facility: HOSPITAL | Age: 69
End: 2019-11-04
Attending: INTERNAL MEDICINE
Payer: MEDICARE

## 2019-11-04 ENCOUNTER — PATIENT OUTREACH (OUTPATIENT)
Dept: ADMINISTRATIVE | Facility: OTHER | Age: 69
End: 2019-11-04

## 2019-11-04 DIAGNOSIS — I10 ESSENTIAL HYPERTENSION: ICD-10-CM

## 2019-11-04 LAB
ALBUMIN SERPL BCP-MCNC: 4.3 G/DL (ref 3.5–5.2)
ALP SERPL-CCNC: 120 U/L (ref 55–135)
ALT SERPL W/O P-5'-P-CCNC: 23 U/L (ref 10–44)
ANION GAP SERPL CALC-SCNC: 8 MMOL/L (ref 8–16)
AST SERPL-CCNC: 21 U/L (ref 10–40)
BILIRUB SERPL-MCNC: 0.9 MG/DL (ref 0.1–1)
BUN SERPL-MCNC: 11 MG/DL (ref 8–23)
CALCIUM SERPL-MCNC: 9.6 MG/DL (ref 8.7–10.5)
CHLORIDE SERPL-SCNC: 106 MMOL/L (ref 95–110)
CHOLEST SERPL-MCNC: 230 MG/DL (ref 120–199)
CHOLEST/HDLC SERPL: 4.8 {RATIO} (ref 2–5)
CO2 SERPL-SCNC: 28 MMOL/L (ref 23–29)
CREAT SERPL-MCNC: 1.1 MG/DL (ref 0.5–1.4)
EST. GFR  (AFRICAN AMERICAN): >60 ML/MIN/1.73 M^2
EST. GFR  (NON AFRICAN AMERICAN): >60 ML/MIN/1.73 M^2
GLUCOSE SERPL-MCNC: 98 MG/DL (ref 70–110)
HDLC SERPL-MCNC: 48 MG/DL (ref 40–75)
HDLC SERPL: 20.9 % (ref 20–50)
LDLC SERPL CALC-MCNC: 161.2 MG/DL (ref 63–159)
NONHDLC SERPL-MCNC: 182 MG/DL
POTASSIUM SERPL-SCNC: 4.2 MMOL/L (ref 3.5–5.1)
PROT SERPL-MCNC: 7.5 G/DL (ref 6–8.4)
SODIUM SERPL-SCNC: 142 MMOL/L (ref 136–145)
TRIGL SERPL-MCNC: 104 MG/DL (ref 30–150)

## 2019-11-04 PROCEDURE — 36415 COLL VENOUS BLD VENIPUNCTURE: CPT

## 2019-11-04 PROCEDURE — 80061 LIPID PANEL: CPT

## 2019-11-04 PROCEDURE — 80053 COMPREHEN METABOLIC PANEL: CPT

## 2019-11-07 ENCOUNTER — OFFICE VISIT (OUTPATIENT)
Dept: OTOLARYNGOLOGY | Facility: CLINIC | Age: 69
End: 2019-11-07
Payer: MEDICARE

## 2019-11-07 VITALS
BODY MASS INDEX: 25.41 KG/M2 | DIASTOLIC BLOOD PRESSURE: 87 MMHG | SYSTOLIC BLOOD PRESSURE: 147 MMHG | WEIGHT: 187.38 LBS | HEART RATE: 71 BPM

## 2019-11-07 DIAGNOSIS — B37.0 THRUSH, ORAL: ICD-10-CM

## 2019-11-07 DIAGNOSIS — H93.8X3 EAR FULLNESS, BILATERAL: Primary | ICD-10-CM

## 2019-11-07 PROCEDURE — 99999 PR PBB SHADOW E&M-EST. PATIENT-LVL III: CPT | Mod: PBBFAC,,, | Performed by: OTOLARYNGOLOGY

## 2019-11-07 PROCEDURE — 69210 REMOVE IMPACTED EAR WAX UNI: CPT | Mod: 50,PBBFAC | Performed by: OTOLARYNGOLOGY

## 2019-11-07 PROCEDURE — 99999 PR PBB SHADOW E&M-EST. PATIENT-LVL III: ICD-10-PCS | Mod: PBBFAC,,, | Performed by: OTOLARYNGOLOGY

## 2019-11-07 PROCEDURE — 69210 PR REMOVAL IMPACTED CERUMEN REQUIRING INSTRUMENTATION, UNILATERAL: ICD-10-PCS | Mod: S$PBB,,, | Performed by: OTOLARYNGOLOGY

## 2019-11-07 PROCEDURE — 69210 REMOVE IMPACTED EAR WAX UNI: CPT | Mod: S$PBB,,, | Performed by: OTOLARYNGOLOGY

## 2019-11-07 PROCEDURE — 99213 PR OFFICE/OUTPT VISIT, EST, LEVL III, 20-29 MIN: ICD-10-PCS | Mod: 25,S$PBB,, | Performed by: OTOLARYNGOLOGY

## 2019-11-07 PROCEDURE — 99213 OFFICE O/P EST LOW 20 MIN: CPT | Mod: 25,S$PBB,, | Performed by: OTOLARYNGOLOGY

## 2019-11-07 PROCEDURE — 99213 OFFICE O/P EST LOW 20 MIN: CPT | Mod: PBBFAC,25 | Performed by: OTOLARYNGOLOGY

## 2019-11-07 RX ORDER — FLUCONAZOLE 150 MG/1
150 TABLET ORAL DAILY
Qty: 1 TABLET | Refills: 0 | Status: SHIPPED | OUTPATIENT
Start: 2019-11-07 | End: 2019-11-08

## 2019-11-07 NOTE — PROGRESS NOTES
Subjective:       Patient ID: David Martin is a 69 y.o. male.    Chief Complaint: Ear Fullness    Ear Fullness    There is pain in both ears. This is a recurrent (Last seen on 12/18/2018 for same complaint which turned out to be cerumen impactions.) problem. The current episode started 1 to 4 weeks ago. The problem occurs constantly. The problem has been unchanged. There has been no fever. The patient is experiencing no pain. Associated symptoms include hearing loss and rhinorrhea. Pertinent negatives include no coughing, diarrhea, ear discharge, headaches, neck pain, rash or vomiting. He has tried nothing for the symptoms. His past medical history is significant for hearing loss.     Review of Systems   Constitutional: Negative for activity change, appetite change and fever.   HENT: Positive for hearing loss and rhinorrhea. Negative for congestion, ear discharge, ear pain, nosebleeds, postnasal drip and sneezing.    Eyes: Negative for redness and visual disturbance.   Respiratory: Positive for wheezing. Negative for apnea, cough and shortness of breath.    Cardiovascular: Negative for chest pain and palpitations.   Gastrointestinal: Negative for diarrhea and vomiting.   Genitourinary: Negative for difficulty urinating and frequency.   Musculoskeletal: Negative for arthralgias, back pain, gait problem and neck pain.   Skin: Negative for color change and rash.   Neurological: Negative for dizziness, speech difficulty, weakness and headaches.   Hematological: Negative for adenopathy. Does not bruise/bleed easily.   Psychiatric/Behavioral: Negative for agitation and behavioral problems.       Objective:        Constitutional:   Vital signs are normal. He appears well-developed and well-nourished. He is active. Normal speech.      Head:  Normocephalic and atraumatic. Salivary glands normal.  Facial strength is normal.      Nose:  Nose normal including turbinates, nasal mucosa, sinuses and nasal septum.      Mouth/Throat  Oropharynx clear and moist without lesions or asymmetry and normal uvula midline.     Neck:  Neck normal without thyromegaly masses, asymmetry, normal tracheal structure, crepitus, and tenderness, thyroid normal, trachea normal, phonation normal and full range of motion with neck supple.     Psychiatric:   He has a normal mood and affect. His speech is normal and behavior is normal.     Neurological:   He has neurological normal, alert and oriented.     Skin:   No abrasions, lacerations, lesions, or rashes.         PROCEDURE NOTE:  Ceruminosis is noted in both EACs.  Wax was removed by manual debridement and suctioning utilizing the assistance of binocular microscopy revealing EACs and TMs WNL. The patient tolerated this procedure without difficulty. The subjective decrease noted in hearing pre-cleaning was resolved post-cleaning.       Assessment:       1. Ear fullness, bilateral        Plan:       1. Hearing conservation strongly recommended.  2. Trial of amplification is not currently indicated.  3. Re-check of hearing after 71 years of age.  4. F/U with PCP as per schedule.

## 2019-11-08 ENCOUNTER — TELEPHONE (OUTPATIENT)
Dept: CARDIOLOGY | Facility: CLINIC | Age: 69
End: 2019-11-08

## 2019-12-02 ENCOUNTER — PATIENT MESSAGE (OUTPATIENT)
Dept: OTOLARYNGOLOGY | Facility: CLINIC | Age: 69
End: 2019-12-02

## 2019-12-03 ENCOUNTER — TELEPHONE (OUTPATIENT)
Dept: OTOLARYNGOLOGY | Facility: CLINIC | Age: 69
End: 2019-12-03

## 2019-12-21 ENCOUNTER — PATIENT OUTREACH (OUTPATIENT)
Dept: ADMINISTRATIVE | Facility: OTHER | Age: 69
End: 2019-12-21

## 2019-12-23 ENCOUNTER — OFFICE VISIT (OUTPATIENT)
Dept: OPTOMETRY | Facility: CLINIC | Age: 69
End: 2019-12-23
Payer: MEDICARE

## 2019-12-23 DIAGNOSIS — H25.13 NS (NUCLEAR SCLEROSIS), BILATERAL: Primary | ICD-10-CM

## 2019-12-23 DIAGNOSIS — Z46.0 FITTING AND ADJUSTMENT OF SPECTACLES AND CONTACT LENSES: Primary | ICD-10-CM

## 2019-12-23 DIAGNOSIS — H52.4 PRESBYOPIA: ICD-10-CM

## 2019-12-23 DIAGNOSIS — H47.393 OPTIC NERVE CUPPING OF BOTH EYES: ICD-10-CM

## 2019-12-23 DIAGNOSIS — H52.223 REGULAR ASTIGMATISM OF BOTH EYES: ICD-10-CM

## 2019-12-23 PROCEDURE — 92310 CONTACT LENS FITTING OU: CPT | Mod: CSM,,, | Performed by: OPTOMETRIST

## 2019-12-23 PROCEDURE — 99212 OFFICE O/P EST SF 10 MIN: CPT | Mod: PBBFAC,25 | Performed by: OPTOMETRIST

## 2019-12-23 PROCEDURE — 92014 PR EYE EXAM, EST PATIENT,COMPREHESV: ICD-10-PCS | Mod: S$PBB,,, | Performed by: OPTOMETRIST

## 2019-12-23 PROCEDURE — 92015 DETERMINE REFRACTIVE STATE: CPT | Mod: ,,, | Performed by: OPTOMETRIST

## 2019-12-23 PROCEDURE — 92015 PR REFRACTION: ICD-10-PCS | Mod: ,,, | Performed by: OPTOMETRIST

## 2019-12-23 PROCEDURE — 92133 CPTRZD OPH DX IMG PST SGM ON: CPT | Mod: PBBFAC | Performed by: OPTOMETRIST

## 2019-12-23 PROCEDURE — 92014 COMPRE OPH EXAM EST PT 1/>: CPT | Mod: S$PBB,,, | Performed by: OPTOMETRIST

## 2019-12-23 PROCEDURE — 92133 POSTERIOR SEGMENT OCT OPTIC NERVE(OCULAR COHERENCE TOMOGRAPHY) - OU - BOTH EYES: ICD-10-PCS | Mod: 26,S$PBB,, | Performed by: OPTOMETRIST

## 2019-12-23 PROCEDURE — 99999 PR PBB SHADOW E&M-EST. PATIENT-LVL II: CPT | Mod: PBBFAC,,, | Performed by: OPTOMETRIST

## 2019-12-23 PROCEDURE — 99999 PR PBB SHADOW E&M-EST. PATIENT-LVL II: ICD-10-PCS | Mod: PBBFAC,,, | Performed by: OPTOMETRIST

## 2019-12-23 PROCEDURE — 92310 PR CONTACT LENS FITTING (NO CHANGE): ICD-10-PCS | Mod: CSM,,, | Performed by: OPTOMETRIST

## 2019-12-23 NOTE — PROGRESS NOTES
HPI     Pt here for annual and cl fit  No changes in vision since last seen.  Patient denies diplopia, headaches, flashes/floaters, pain, and   itching/burning/tearing.    Uses visine prn    Last edited by Jenny Cardenas on 12/23/2019 10:43 AM. (History)            Assessment /Plan     For exam results, see Encounter Report.        Optic nerve cupping of both eyes  Glaucoma suspect, bilateral  -     Posterior Segment OCT Optic Nerve-Mild thinning inf temp OU, stable OU        Cupping OU, healthy rim appearance        IOP WNL        Repeat 2 years OCT optic nerve and HVF 24-2     NS (nuclear sclerosis), bilateral               Mild, monitor    Presbyopia  Regular astigmatism of both eyes         CL fit today: continue with current contacts   Remove nightly, replace monthly    RTC 1 year, sooner PRN

## 2020-03-05 NOTE — TELEPHONE ENCOUNTER
----- Message from Angela Nolan sent at 8/1/2017  9:33 AM CDT -----  Contact: self/[phone]  Patient called in regards needing to tell Dr Hair that overnight he felt better, however want to know if Dr Hair still want to see him. Please call and advise.           Thank you!!!   Yes

## 2020-10-05 ENCOUNTER — PATIENT MESSAGE (OUTPATIENT)
Dept: ADMINISTRATIVE | Facility: HOSPITAL | Age: 70
End: 2020-10-05

## 2020-12-15 DIAGNOSIS — R06.2 WHEEZING: ICD-10-CM

## 2020-12-15 DIAGNOSIS — J30.1 SEASONAL ALLERGIC RHINITIS DUE TO POLLEN: ICD-10-CM

## 2020-12-15 RX ORDER — BUDESONIDE AND FORMOTEROL FUMARATE DIHYDRATE 160; 4.5 UG/1; UG/1
2 AEROSOL RESPIRATORY (INHALATION) EVERY 12 HOURS
Qty: 10.2 G | Refills: 11 | Status: SHIPPED | OUTPATIENT
Start: 2020-12-15 | End: 2021-03-12 | Stop reason: SDUPTHER

## 2021-01-04 ENCOUNTER — PATIENT MESSAGE (OUTPATIENT)
Dept: ADMINISTRATIVE | Facility: HOSPITAL | Age: 71
End: 2021-01-04

## 2021-03-12 DIAGNOSIS — J30.1 SEASONAL ALLERGIC RHINITIS DUE TO POLLEN: ICD-10-CM

## 2021-03-12 DIAGNOSIS — R06.2 WHEEZING: ICD-10-CM

## 2021-03-12 RX ORDER — BUDESONIDE AND FORMOTEROL FUMARATE DIHYDRATE 160; 4.5 UG/1; UG/1
2 AEROSOL RESPIRATORY (INHALATION) EVERY 12 HOURS
Qty: 10.2 G | Refills: 11 | Status: SHIPPED | OUTPATIENT
Start: 2021-03-12 | End: 2022-03-30 | Stop reason: SDUPTHER

## 2021-03-26 ENCOUNTER — TELEPHONE (OUTPATIENT)
Dept: OPTOMETRY | Facility: CLINIC | Age: 71
End: 2021-03-26

## 2021-03-29 ENCOUNTER — TELEPHONE (OUTPATIENT)
Dept: OPTOMETRY | Facility: CLINIC | Age: 71
End: 2021-03-29

## 2021-05-08 ENCOUNTER — PATIENT MESSAGE (OUTPATIENT)
Dept: OPTOMETRY | Facility: CLINIC | Age: 71
End: 2021-05-08

## 2021-05-13 ENCOUNTER — PATIENT MESSAGE (OUTPATIENT)
Dept: OPTOMETRY | Facility: CLINIC | Age: 71
End: 2021-05-13

## 2021-05-13 ENCOUNTER — OFFICE VISIT (OUTPATIENT)
Dept: OPTOMETRY | Facility: CLINIC | Age: 71
End: 2021-05-13
Attending: FAMILY MEDICINE
Payer: MEDICARE

## 2021-05-13 DIAGNOSIS — H47.393 OPTIC NERVE CUPPING OF BOTH EYES: ICD-10-CM

## 2021-05-13 DIAGNOSIS — H43.812 PVD (POSTERIOR VITREOUS DETACHMENT), LEFT EYE: Primary | ICD-10-CM

## 2021-05-13 DIAGNOSIS — H52.4 PRESBYOPIA: ICD-10-CM

## 2021-05-13 DIAGNOSIS — H52.223 REGULAR ASTIGMATISM OF BOTH EYES: ICD-10-CM

## 2021-05-13 DIAGNOSIS — H25.13 NS (NUCLEAR SCLEROSIS), BILATERAL: ICD-10-CM

## 2021-05-13 PROCEDURE — 92014 COMPRE OPH EXAM EST PT 1/>: CPT | Mod: S$PBB,,, | Performed by: OPTOMETRIST

## 2021-05-13 PROCEDURE — 99999 PR PBB SHADOW E&M-EST. PATIENT-LVL II: ICD-10-PCS | Mod: PBBFAC,,, | Performed by: OPTOMETRIST

## 2021-05-13 PROCEDURE — 92015 DETERMINE REFRACTIVE STATE: CPT | Mod: ,,, | Performed by: OPTOMETRIST

## 2021-05-13 PROCEDURE — 99212 OFFICE O/P EST SF 10 MIN: CPT | Mod: PBBFAC | Performed by: OPTOMETRIST

## 2021-05-13 PROCEDURE — 99999 PR PBB SHADOW E&M-EST. PATIENT-LVL II: CPT | Mod: PBBFAC,,, | Performed by: OPTOMETRIST

## 2021-05-13 PROCEDURE — 92015 PR REFRACTION: ICD-10-PCS | Mod: ,,, | Performed by: OPTOMETRIST

## 2021-05-13 PROCEDURE — 92014 PR EYE EXAM, EST PATIENT,COMPREHESV: ICD-10-PCS | Mod: S$PBB,,, | Performed by: OPTOMETRIST

## 2022-02-02 ENCOUNTER — TELEPHONE (OUTPATIENT)
Dept: OTOLARYNGOLOGY | Facility: CLINIC | Age: 72
End: 2022-02-02
Payer: MEDICARE

## 2022-02-02 NOTE — TELEPHONE ENCOUNTER
----- Message from Marisol Barraza sent at 2/2/2022  1:21 PM CST -----  Patient is wanting to know if he could be seen on Thursday 2/10 afternoon or anything on Friday 2/111 due to him going to be in town. Patient is needing his ears clean. Patient call back number is [phone].

## 2022-03-30 DIAGNOSIS — J30.1 SEASONAL ALLERGIC RHINITIS DUE TO POLLEN: ICD-10-CM

## 2022-03-30 DIAGNOSIS — R06.2 WHEEZING: ICD-10-CM

## 2022-03-30 RX ORDER — BUDESONIDE AND FORMOTEROL FUMARATE DIHYDRATE 160; 4.5 UG/1; UG/1
2 AEROSOL RESPIRATORY (INHALATION) EVERY 12 HOURS
Qty: 10.2 G | Refills: 11 | Status: SHIPPED | OUTPATIENT
Start: 2022-03-30 | End: 2023-03-30

## 2022-09-01 ENCOUNTER — PES CALL (OUTPATIENT)
Dept: ADMINISTRATIVE | Facility: OTHER | Age: 72
End: 2022-09-01
Payer: COMMERCIAL

## 2022-11-23 ENCOUNTER — PATIENT MESSAGE (OUTPATIENT)
Dept: OPTOMETRY | Facility: CLINIC | Age: 72
End: 2022-11-23
Payer: COMMERCIAL

## 2022-11-23 ENCOUNTER — TELEPHONE (OUTPATIENT)
Dept: INTERNAL MEDICINE | Facility: CLINIC | Age: 72
End: 2022-11-23
Payer: COMMERCIAL

## 2022-11-23 NOTE — TELEPHONE ENCOUNTER
----- Message from Rebecca Lang sent at 11/23/2022  3:28 PM CST -----  Contact: [phone] Patient  Pt is requesting orders for a chest xray and EKG. Please send pt a [facility] message.

## 2022-12-19 ENCOUNTER — OFFICE VISIT (OUTPATIENT)
Dept: INTERNAL MEDICINE | Facility: CLINIC | Age: 72
End: 2022-12-19
Payer: COMMERCIAL

## 2022-12-19 VITALS
DIASTOLIC BLOOD PRESSURE: 90 MMHG | HEIGHT: 72 IN | OXYGEN SATURATION: 100 % | BODY MASS INDEX: 26.38 KG/M2 | TEMPERATURE: 99 F | HEART RATE: 72 BPM | SYSTOLIC BLOOD PRESSURE: 160 MMHG | WEIGHT: 194.75 LBS

## 2022-12-19 DIAGNOSIS — J30.1 SEASONAL ALLERGIC RHINITIS DUE TO POLLEN: ICD-10-CM

## 2022-12-19 DIAGNOSIS — Z12.5 SCREENING FOR PROSTATE CANCER: ICD-10-CM

## 2022-12-19 DIAGNOSIS — Z13.6 ENCOUNTER FOR LIPID SCREENING FOR CARDIOVASCULAR DISEASE: ICD-10-CM

## 2022-12-19 DIAGNOSIS — Z00.00 ANNUAL PHYSICAL EXAM: Primary | ICD-10-CM

## 2022-12-19 DIAGNOSIS — Z56.9: ICD-10-CM

## 2022-12-19 DIAGNOSIS — R06.02 SOB (SHORTNESS OF BREATH): ICD-10-CM

## 2022-12-19 DIAGNOSIS — Z13.220 ENCOUNTER FOR LIPID SCREENING FOR CARDIOVASCULAR DISEASE: ICD-10-CM

## 2022-12-19 DIAGNOSIS — I10 PRIMARY HYPERTENSION: ICD-10-CM

## 2022-12-19 PROCEDURE — 99214 OFFICE O/P EST MOD 30 MIN: CPT | Mod: PBBFAC | Performed by: NURSE PRACTITIONER

## 2022-12-19 PROCEDURE — 93010 ELECTROCARDIOGRAM REPORT: CPT | Mod: S$GLB,,, | Performed by: INTERNAL MEDICINE

## 2022-12-19 PROCEDURE — 93010 EKG 12-LEAD: ICD-10-PCS | Mod: S$GLB,,, | Performed by: INTERNAL MEDICINE

## 2022-12-19 PROCEDURE — 93005 ELECTROCARDIOGRAM TRACING: CPT | Mod: PBBFAC | Performed by: INTERNAL MEDICINE

## 2022-12-19 PROCEDURE — 99387 INIT PM E/M NEW PAT 65+ YRS: CPT | Mod: S$GLB,,, | Performed by: NURSE PRACTITIONER

## 2022-12-19 PROCEDURE — 99999 PR PBB SHADOW E&M-EST. PATIENT-LVL IV: CPT | Mod: PBBFAC,,, | Performed by: NURSE PRACTITIONER

## 2022-12-19 PROCEDURE — 93005 ELECTROCARDIOGRAM TRACING: CPT | Mod: S$GLB,,, | Performed by: NURSE PRACTITIONER

## 2022-12-19 PROCEDURE — 99387 PR PREVENTIVE VISIT,NEW,65 & OVER: ICD-10-PCS | Mod: S$GLB,,, | Performed by: NURSE PRACTITIONER

## 2022-12-19 PROCEDURE — 99999 PR PBB SHADOW E&M-EST. PATIENT-LVL IV: ICD-10-PCS | Mod: PBBFAC,,, | Performed by: NURSE PRACTITIONER

## 2022-12-19 PROCEDURE — 93005 EKG 12-LEAD: ICD-10-PCS | Mod: S$GLB,,, | Performed by: NURSE PRACTITIONER

## 2022-12-19 RX ORDER — LOSARTAN POTASSIUM 50 MG/1
50 TABLET ORAL DAILY
Qty: 90 TABLET | Refills: 3 | Status: SHIPPED | OUTPATIENT
Start: 2022-12-19 | End: 2023-05-31 | Stop reason: SDUPTHER

## 2022-12-19 SDOH — SOCIAL DETERMINANTS OF HEALTH (SDOH): UNSPECIFIED PROBLEMS RELATED TO EMPLOYMENT: Z56.9

## 2022-12-19 NOTE — PROGRESS NOTES
[facility] Note    Chief Complaint      Chief Complaint   Patient presents with    Annual Exam     History of Present Illness      David Martin is a 72 y.o. male patient of Dr. Bhatia with chronic conditions of seasonal allergies, who presents today for his annual visit exam. Pt feeling well, no complaints, denies sob or cp. Reviewed meds and history with pt. Regular diet, stays hydrated and active.   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 But now since his BP has increased, he will start to exercise by walking his 2-3 mi per day. He reports he never started the BP med that was prescribed.   So we will restart losartan.  Pt is a , lives in [facility]    Comes to appt alone  Practices safety measures such as wearing his seatbelt    Labs-ordered  Eye exams-utd, Dr. King  Colonoscopy- [facility]kit 2020      Vaccines:  Covid- J&J  Tdap-declines  Flu shot-declines      Health Maintenance   Topic Date Due    TETANUS VACCINE  Never done    Lipid Panel  11/04/2024    Hepatitis C Screening  Completed       Past Medical History:   Diagnosis Date    Allergy     Lipoma 12/19/2014       Past Surgical History:   Procedure Laterality Date    COLONOSCOPY N/A 12/21/2016    Procedure: COLONOSCOPY, excision of skin tag (Please schedule early in morning);  Surgeon: Nas Verdugo MD;  Location: [facility] ([address] [place] [address]);  Service: Endoscopy;  Laterality: N/A;    RHINOPLASTY TIP         family history includes Hypertension in his mother; No Known Problems in his father.     Social History     Tobacco Use    Smoking status: Never    Smokeless tobacco: Never   Substance Use Topics    Alcohol use: No    Drug use: No       Review of Systems   Constitutional:  Negative for chills and fever.   HENT:  Negative for congestion, sinus pain and sore throat.    Eyes:  Negative for blurred vision.   Respiratory:  Negative for cough, shortness of breath and  wheezing.    Cardiovascular:  Negative for chest pain, palpitations and leg swelling.   Gastrointestinal:  Negative for abdominal pain, constipation, diarrhea, nausea and vomiting.   Genitourinary:  Negative for dysuria.   Musculoskeletal:  Negative for myalgias.   Skin:  Negative for rash.   Neurological:  Negative for dizziness, weakness and headaches.   Psychiatric/Behavioral:  Negative for depression. The patient is not nervous/anxious.       Outpatient Encounter Medications as of 12/19/2022   Medication Sig Dispense Refill    budesonide-formoterol 160-4.5 mcg (SYMBICORT) 160-4.5 mcg/actuation HFAA Inhale 2 puffs into the lungs every 12 (twelve) hours. Controller 10.2 g 11    losartan (COZAAR) 50 MG tablet Take 1 tablet (50 mg total) by mouth once daily. 90 tablet 3    [DISCONTINUED] losartan (COZAAR) 25 MG tablet Take 1 tablet (25 mg total) by mouth once daily. 90 tablet 3     No facility-administered encounter medications on file as of 12/19/2022.       Review of patient's allergies indicates:   Allergen Reactions    Pollen extracts     Ragweed        Physical Exam      Vital Signs  Temp: 98.5 °F (36.9 °C)  Temp src: Oral  Pulse: 72  SpO2: 100 %  BP: (!) 160/90  BP Location: Right arm  Patient Position: Sitting  Pain Score: 0-No pain  Height and Weight  Height: 6' (182.9 cm)  Weight: 88.3 kg (194 lb 12.4 oz)  BSA (Calculated - sq m): 2.12 sq meters  BMI (Calculated): 26.4  Weight in (lb) to have BMI = 25: 183.9    Physical Exam  Vitals and nursing note reviewed.   Constitutional:       General: He is not in acute distress.     Appearance: Normal appearance. He is well-developed. He is not ill-appearing.   HENT:      Head: Normocephalic and atraumatic.      Right Ear: External ear normal.      Left Ear: External ear normal.   Eyes:      Conjunctiva/sclera: Conjunctivae normal.      Pupils: Pupils are equal, round, and reactive to light.   Neck:      Thyroid: No thyromegaly.      Vascular: No JVD.      Trachea:  No tracheal deviation.   Cardiovascular:      Rate and Rhythm: Normal rate and regular rhythm.      Heart sounds: Normal heart sounds.   Pulmonary:      Effort: Pulmonary effort is normal. No respiratory distress.      Breath sounds: Normal breath sounds.   Abdominal:      General: Bowel sounds are normal. There is no distension.      Palpations: Abdomen is soft.      Tenderness: There is no abdominal tenderness.   Musculoskeletal:         General: Normal range of motion.      Cervical back: Normal range of motion and neck supple.   Lymphadenopathy:      Cervical: No cervical adenopathy.   Skin:     General: Skin is warm and dry.      Coloration: Skin is not pale.      Findings: No erythema or rash.   Neurological:      General: No focal deficit present.      Mental Status: He is alert and oriented to person, place, and time.   Psychiatric:         Mood and Affect: Mood normal.         Behavior: Behavior normal.         Thought Content: Thought content normal.         Judgment: Judgment normal.        Laboratory:  CBC:  Lab Results   Component Value Date    WBC 7.16 07/23/2018    RBC 4.96 07/23/2018    HGB 15.2 07/23/2018    HCT 44.2 07/23/2018     07/23/2018    MCV 89 07/23/2018    MCH 30.6 07/23/2018    MCHC 34.4 07/23/2018    MCHC 35.1 12/30/2017    MCHC 35.2 02/21/2017     CMP:  Lab Results   Component Value Date    GLU 98 11/04/2019    CALCIUM 9.6 11/04/2019    ALBUMIN 4.3 11/04/2019    PROT 7.5 11/04/2019     11/04/2019    K 4.2 11/04/2019    CO2 28 11/04/2019     11/04/2019    BUN 11 11/04/2019    ALKPHOS 120 11/04/2019    ALT 23 11/04/2019    AST 21 11/04/2019    BILITOT 0.9 11/04/2019    BILITOT 0.6 12/30/2017    BILITOT 1.1 (H) 02/21/2017     URINALYSIS:  Lab Results   Component Value Date    COLORU Yellow 02/21/2017    SPECGRAV 1.015 02/21/2017    PHUR 5.0 02/21/2017    PROTEINUA Negative 02/21/2017    NITRITE Negative 02/21/2017    LEUKOCYTESUR Negative 02/21/2017    UROBILINOGEN  Negative 02/21/2017      LIPIDS:  Lab Results   Component Value Date    TSH 1.643 12/22/2014    HDL 48 11/04/2019    HDL 42 03/16/2017    HDL 46 12/22/2014    CHOL 230 (H) 11/04/2019    CHOL 241 (H) 03/16/2017    CHOL 277 (H) 12/22/2014    TRIG 104 11/04/2019    TRIG 121 03/16/2017    TRIG 116 12/22/2014    LDLCALC 161.2 (H) 11/04/2019    LDLCALC 174.8 (H) 03/16/2017    LDLCALC 207.8 (H) 12/22/2014    CHOLHDL 20.9 11/04/2019    CHOLHDL 17.4 (L) 03/16/2017    CHOLHDL 16.6 (L) 12/22/2014    NONHDLCHOL 182 11/04/2019    NONHDLCHOL 199 03/16/2017    NONHDLCHOL 231 12/22/2014    TOTALCHOLEST 4.8 11/04/2019    TOTALCHOLEST 5.7 (H) 03/16/2017    TOTALCHOLEST 6.0 (H) 12/22/2014     TSH:  Lab Results   Component Value Date    TSH 1.643 12/22/2014     A1C:  No results found for: HGBA1C      Assessment/Plan     David Martin is a 72 y.o.male with:    Annual physical exam  -     CBC Auto Differential; Future; Expected date: 12/19/2022  -     Comprehensive Metabolic Panel; Future; Expected date: 12/19/2022  -     Lipid Panel; Future; Expected date: 12/19/2022  -     T4, Free; Future; Expected date: 12/19/2022  -     TSH; Future; Expected date: 12/19/2022  -     PSA, SCREENING; Future; Expected date: 12/19/2022  -     X-Ray Chest PA And Lateral; Future; Expected date: 12/19/2022  -     IN OFFICE EKG 12-LEAD (to Muse)    Primary hypertension  -     CBC Auto Differential; Future; Expected date: 12/19/2022  -     Comprehensive Metabolic Panel; Future; Expected date: 12/19/2022  -     Lipid Panel; Future; Expected date: 12/19/2022  -     T4, Free; Future; Expected date: 12/19/2022  -     TSH; Future; Expected date: 12/19/2022  -     PSA, SCREENING; Future; Expected date: 12/19/2022  -     X-Ray Chest PA And Lateral; Future; Expected date: 12/19/2022  -     IN OFFICE EKG 12-LEAD (to Muse)  -     losartan (COZAAR) 50 MG tablet; Take 1 tablet (50 mg total) by mouth once daily.  Dispense: 90 tablet; Refill: 3    Seasonal allergic  rhinitis due to pollen  -     CBC Auto Differential; Future; Expected date: 12/19/2022  -     Comprehensive Metabolic Panel; Future; Expected date: 12/19/2022  -     Lipid Panel; Future; Expected date: 12/19/2022  -     T4, Free; Future; Expected date: 12/19/2022  -     TSH; Future; Expected date: 12/19/2022  -     PSA, SCREENING; Future; Expected date: 12/19/2022  -     X-Ray Chest PA And Lateral; Future; Expected date: 12/19/2022  -     IN OFFICE EKG 12-LEAD (to Muse)    Encounter for lipid screening for cardiovascular disease  -     CBC Auto Differential; Future; Expected date: 12/19/2022  -     Comprehensive Metabolic Panel; Future; Expected date: 12/19/2022  -     Lipid Panel; Future; Expected date: 12/19/2022  -     T4, Free; Future; Expected date: 12/19/2022  -     TSH; Future; Expected date: 12/19/2022  -     PSA, SCREENING; Future; Expected date: 12/19/2022  -     X-Ray Chest PA And Lateral; Future; Expected date: 12/19/2022  -     IN OFFICE EKG 12-LEAD (to Muse)    Screening for prostate cancer  -     CBC Auto Differential; Future; Expected date: 12/19/2022  -     Comprehensive Metabolic Panel; Future; Expected date: 12/19/2022  -     Lipid Panel; Future; Expected date: 12/19/2022  -     T4, Free; Future; Expected date: 12/19/2022  -     TSH; Future; Expected date: 12/19/2022  -     PSA, SCREENING; Future; Expected date: 12/19/2022  -     X-Ray Chest PA And Lateral; Future; Expected date: 12/19/2022  -     IN OFFICE EKG 12-LEAD (to Muse)    SOB (shortness of breath)  -     X-Ray Chest PA And Lateral; Future; Expected date: 12/19/2022  -     IN OFFICE EKG 12-LEAD (to Muse)    Effect, adverse, working environment         Health Maintenance Due   Topic Date Due    TETANUS VACCINE  Never done    Shingles Vaccine (1 of 2) Never done    Pneumococcal Vaccines (Age 65+) (1 - PCV) Never done    COVID-19 Vaccine (2 - Booster for Ain series) 05/26/2021    Influenza Vaccine (1) 09/01/2022          I spent 40  minutes on the day of this encounter for preparing for, evaluating, treating, and managing this patient.        -Continue current medications and maintain follow up with specialists.  Return to clinic in 1 year or sooner for any concerns   No follow-ups on file.      AREIL PerezC  [facility]

## 2023-01-14 ENCOUNTER — HOSPITAL ENCOUNTER (OUTPATIENT)
Dept: RADIOLOGY | Facility: HOSPITAL | Age: 73
Discharge: HOME | End: 2023-01-14
Attending: NURSE PRACTITIONER
Payer: COMMERCIAL

## 2023-01-14 DIAGNOSIS — R06.02 SOB (SHORTNESS OF BREATH): ICD-10-CM

## 2023-01-14 DIAGNOSIS — Z00.00 ANNUAL PHYSICAL EXAM: ICD-10-CM

## 2023-01-14 DIAGNOSIS — Z13.220 ENCOUNTER FOR LIPID SCREENING FOR CARDIOVASCULAR DISEASE: ICD-10-CM

## 2023-01-14 DIAGNOSIS — I10 PRIMARY HYPERTENSION: ICD-10-CM

## 2023-01-14 DIAGNOSIS — Z13.6 ENCOUNTER FOR LIPID SCREENING FOR CARDIOVASCULAR DISEASE: ICD-10-CM

## 2023-01-14 DIAGNOSIS — Z12.5 SCREENING FOR PROSTATE CANCER: ICD-10-CM

## 2023-01-14 DIAGNOSIS — J30.1 SEASONAL ALLERGIC RHINITIS DUE TO POLLEN: ICD-10-CM

## 2023-01-14 DIAGNOSIS — Z56.9: ICD-10-CM

## 2023-01-14 PROCEDURE — 71046 X-RAY EXAM CHEST 2 VIEWS: CPT | Mod: TC

## 2023-01-14 PROCEDURE — 71046 XR CHEST PA AND LATERAL: ICD-10-PCS | Mod: 26,,, | Performed by: RADIOLOGY

## 2023-01-14 PROCEDURE — 71046 X-RAY EXAM CHEST 2 VIEWS: CPT | Mod: 26,,, | Performed by: RADIOLOGY

## 2023-01-14 SDOH — SOCIAL DETERMINANTS OF HEALTH (SDOH): UNSPECIFIED PROBLEMS RELATED TO EMPLOYMENT: Z56.9

## 2023-01-18 DIAGNOSIS — E78.5 HYPERLIPIDEMIA, UNSPECIFIED HYPERLIPIDEMIA TYPE: Primary | ICD-10-CM

## 2023-01-18 RX ORDER — ATORVASTATIN CALCIUM 20 MG/1
20 TABLET, FILM COATED ORAL NIGHTLY
Qty: 90 TABLET | Refills: 1 | Status: SHIPPED | OUTPATIENT
Start: 2023-01-18 | End: 2024-01-18

## 2023-05-26 ENCOUNTER — PATIENT MESSAGE (OUTPATIENT)
Dept: PRIMARY CARE CLINIC | Facility: CLINIC | Age: 73
End: 2023-05-26
Payer: COMMERCIAL

## 2023-05-31 DIAGNOSIS — I10 PRIMARY HYPERTENSION: ICD-10-CM

## 2023-05-31 RX ORDER — LOSARTAN POTASSIUM 50 MG/1
50 TABLET ORAL DAILY
Qty: 90 TABLET | Refills: 3 | Status: SHIPPED | OUTPATIENT
Start: 2023-05-31 | End: 2023-06-02 | Stop reason: DRUGHIGH

## 2023-05-31 NOTE — TELEPHONE ENCOUNTER
----- Message from Mandy Herndon sent at 5/31/2023 10:44 AM CDT -----  Type:  RX Refill Request     Who Called: Pt   Refill or New Rx: Refill   RX Name and Strength: losartan (COZAAR) 50 MG tablet  How is the patient currently taking it? (ex. 1XDay): Take 1 tablet (50 mg total) by mouth once daily. - Oral  Is this a 30 day or 90 day RX: 90 days   Preferred Pharmacy with phone number: [id number] - [place], [place] - [address]   Phone:  [phone]  Fax:  [phone]  Local or Mail Order: local   Would the patient rather a call back or a response via [facility]? Call back   Best Call Back Number: [phone]  Additional Information:

## 2023-06-01 ENCOUNTER — PATIENT MESSAGE (OUTPATIENT)
Dept: PRIMARY CARE CLINIC | Facility: CLINIC | Age: 73
End: 2023-06-01
Payer: COMMERCIAL

## 2023-06-01 DIAGNOSIS — I10 PRIMARY HYPERTENSION: Primary | ICD-10-CM

## 2023-06-02 ENCOUNTER — OFFICE VISIT (OUTPATIENT)
Dept: OTOLARYNGOLOGY | Facility: CLINIC | Age: 73
End: 2023-06-02
Payer: COMMERCIAL

## 2023-06-02 DIAGNOSIS — H61.23 BILATERAL IMPACTED CERUMEN: Primary | ICD-10-CM

## 2023-06-02 PROCEDURE — 1160F PR REVIEW ALL MEDS BY PRESCRIBER/CLIN PHARMACIST DOCUMENTED: ICD-10-PCS | Mod: CPTII,S$GLB,,

## 2023-06-02 PROCEDURE — 99203 PR OFFICE/OUTPT VISIT, NEW, LEVL III, 30-44 MIN: ICD-10-PCS | Mod: 25,S$GLB,,

## 2023-06-02 PROCEDURE — 3288F FALL RISK ASSESSMENT DOCD: CPT | Mod: CPTII,S$GLB,,

## 2023-06-02 PROCEDURE — 99999 PR PBB SHADOW E&M-EST. PATIENT-LVL II: CPT | Mod: PBBFAC,,,

## 2023-06-02 PROCEDURE — 99999 PR PBB SHADOW E&M-EST. PATIENT-LVL II: ICD-10-PCS | Mod: PBBFAC,,,

## 2023-06-02 PROCEDURE — 69210 PR REMOVAL IMPACTED CERUMEN REQUIRING INSTRUMENTATION, UNILATERAL: ICD-10-PCS | Mod: S$GLB,,,

## 2023-06-02 PROCEDURE — 69210 REMOVE IMPACTED EAR WAX UNI: CPT | Mod: S$GLB,,,

## 2023-06-02 PROCEDURE — 1126F PR PAIN SEVERITY QUANTIFIED, NO PAIN PRESENT: ICD-10-PCS | Mod: CPTII,S$GLB,,

## 2023-06-02 PROCEDURE — 1101F PR PT FALLS ASSESS DOC 0-1 FALLS W/OUT INJ PAST YR: ICD-10-PCS | Mod: CPTII,S$GLB,,

## 2023-06-02 PROCEDURE — 1101F PT FALLS ASSESS-DOCD LE1/YR: CPT | Mod: CPTII,S$GLB,,

## 2023-06-02 PROCEDURE — 4010F ACE/ARB THERAPY RXD/TAKEN: CPT | Mod: CPTII,S$GLB,,

## 2023-06-02 PROCEDURE — 1159F MED LIST DOCD IN RCRD: CPT | Mod: CPTII,S$GLB,,

## 2023-06-02 PROCEDURE — 4010F PR ACE/ARB THEARPY RXD/TAKEN: ICD-10-PCS | Mod: CPTII,S$GLB,,

## 2023-06-02 PROCEDURE — 1126F AMNT PAIN NOTED NONE PRSNT: CPT | Mod: CPTII,S$GLB,,

## 2023-06-02 PROCEDURE — 1159F PR MEDICATION LIST DOCUMENTED IN MEDICAL RECORD: ICD-10-PCS | Mod: CPTII,S$GLB,,

## 2023-06-02 PROCEDURE — 99203 OFFICE O/P NEW LOW 30 MIN: CPT | Mod: 25,S$GLB,,

## 2023-06-02 PROCEDURE — 1160F RVW MEDS BY RX/DR IN RCRD: CPT | Mod: CPTII,S$GLB,,

## 2023-06-02 PROCEDURE — 3288F PR FALLS RISK ASSESSMENT DOCUMENTED: ICD-10-PCS | Mod: CPTII,S$GLB,,

## 2023-06-02 RX ORDER — LOSARTAN POTASSIUM 25 MG/1
25 TABLET ORAL DAILY
Qty: 90 TABLET | Refills: 3 | Status: SHIPPED | OUTPATIENT
Start: 2023-06-02 | End: 2023-07-13 | Stop reason: SDUPTHER

## 2023-06-02 NOTE — PROGRESS NOTES
Subjective:       Patient ID: David Martin is a 72 y.o. male.    Chief Complaint: Cerumen Impaction    HPI  Mr. Payne is a 72 year old male, here today with complaint of bilateral cerumen impaction. He reports he comes in for an ear cleaning with Dr. Grove. He denies any otalgia, otorrhea, ear fullness/pressure, tinnitus or vertigo.    Past Medical History:   Diagnosis Date    Allergy     Lipoma 12/19/2014     Past Surgical History:   Procedure Laterality Date    COLONOSCOPY N/A 12/21/2016    Procedure: COLONOSCOPY, excision of skin tag (Please schedule early in morning);  Surgeon: Nas Verdugo MD;  Location: [facility] ([address] [place] [address]);  Service: Endoscopy;  Laterality: N/A;    RHINOPLASTY TIP       Family History   Problem Relation Age of Onset    Hypertension Mother     No Known Problems Father     Asthma Neg Hx     Emphysema Neg Hx      Social History  Social History     Tobacco Use    Smoking status: Never    Smokeless tobacco: Never   Substance Use Topics    Alcohol use: No    Drug use: No       Review of Systems   HENT: Negative for ear discharge, ear infection, ear pain, hearing loss, postnasal drip, ringing in the ears, sinus infection and sinus pressure.      Respiratory:  Negative for shortness of breath.      Neurological: Negative for dizziness.              Objective:      Physical Exam  Vitals reviewed.   Constitutional:       General: He is not in acute distress.     Appearance: Normal appearance. He is not ill-appearing.   HENT:      Head: Normocephalic and atraumatic. No right periorbital erythema or left periorbital erythema.      Jaw: No trismus, tenderness, swelling or malocclusion.      Salivary Glands: Right salivary gland is not diffusely enlarged or tender. Left salivary gland is not diffusely enlarged or tender.      Right Ear: Hearing, tympanic membrane, ear canal and external ear normal. No laceration, drainage, swelling or tenderness. No middle ear effusion. There is impacted cerumen. No  foreign body. No mastoid tenderness. No PE tube. No hemotympanum. Tympanic membrane is not injected, scarred, perforated, erythematous, retracted or bulging.      Left Ear: Hearing, tympanic membrane, ear canal and external ear normal. No laceration, drainage, swelling or tenderness.  No middle ear effusion. There is impacted cerumen. No foreign body. No mastoid tenderness. No PE tube. No hemotympanum. Tympanic membrane is not injected, scarred, perforated, erythematous, retracted or bulging.      Ears:        Nose: No nasal deformity or signs of injury.      Right Nostril: No foreign body or epistaxis.      Left Nostril: No foreign body or epistaxis.      Right Turbinates: Not enlarged or swollen.      Left Turbinates: Not enlarged or swollen.      Right Sinus: No maxillary sinus tenderness or frontal sinus tenderness.      Left Sinus: No maxillary sinus tenderness or frontal sinus tenderness.      Mouth/Throat:      Lips: Pink.      Mouth: Mucous membranes are moist.      Tongue: No lesions. Tongue does not deviate from midline.      Pharynx: Oropharynx is clear. Uvula midline. No pharyngeal swelling, oropharyngeal exudate, posterior oropharyngeal erythema or uvula swelling.   Eyes:      General: Lids are normal.   Neck:      Thyroid: No thyroid mass or thyroid tenderness.      Trachea: Trachea and phonation normal.   Pulmonary:      Effort: Pulmonary effort is normal. No respiratory distress.   Musculoskeletal:      Cervical back: Normal range of motion and neck supple.   Lymphadenopathy:      Cervical: No cervical adenopathy.   Neurological:      Mental Status: He is alert and oriented to person, place, and time.   Psychiatric:         Attention and Perception: Attention normal.         Mood and Affect: Mood normal.         Speech: Speech normal.         Behavior: Behavior normal. Behavior is cooperative.         Procedure Note:    The patient was brought to the minor procedure room and placed under the  operating microscope. Using a combination of suction, curettes and cup forceps the patient's cerumen impaction was removed left ear canal. The tympanic membrane was evaluated and was unremarkable . The patient tolerated the procedure well. There were no complications.    Procedure Note:    Patient was brought to the minor procedure room and using the operating microscope the right ear canal  was cleaned of ceruminous debris. There was a significant cerumen impaction.  The forceps and suction were both used to perform this. Tympanic membrane intact. Pt tolerated well. There were no complications.Procedure Note:      Assessment:       1. Bilateral impacted cerumen        Plan:       David was seen today for cerumen impaction.    Diagnoses and all orders for this visit:    Bilateral impacted cerumen  Bilateral cerumen impaction removed under microscopy.  Patient tolerated procedure well and noted improvement upon impaction removal.  Otoscopic exam benign.  Education about normal ear hygiene and the avoidance of Q-tips was reviewed.       RTC as needed  Questions answered.

## 2023-07-06 ENCOUNTER — PATIENT MESSAGE (OUTPATIENT)
Dept: OTOLARYNGOLOGY | Facility: CLINIC | Age: 73
End: 2023-07-06
Payer: COMMERCIAL

## 2023-07-13 ENCOUNTER — PATIENT MESSAGE (OUTPATIENT)
Dept: PRIMARY CARE CLINIC | Facility: CLINIC | Age: 73
End: 2023-07-13
Payer: COMMERCIAL

## 2023-07-13 DIAGNOSIS — I10 PRIMARY HYPERTENSION: ICD-10-CM

## 2023-07-13 RX ORDER — LOSARTAN POTASSIUM 25 MG/1
50 TABLET ORAL DAILY
Qty: 180 TABLET | Refills: 3 | Status: SHIPPED | OUTPATIENT
Start: 2023-07-13 | End: 2024-07-12

## 2024-04-15 ENCOUNTER — PATIENT MESSAGE (OUTPATIENT)
Dept: PRIMARY CARE CLINIC | Facility: CLINIC | Age: 74
End: 2024-04-15
Payer: COMMERCIAL

## 2024-06-12 ENCOUNTER — OFFICE VISIT (OUTPATIENT)
Dept: FAMILY MEDICINE | Facility: CLINIC | Age: 74
End: 2024-06-12
Payer: COMMERCIAL

## 2024-06-12 ENCOUNTER — PATIENT MESSAGE (OUTPATIENT)
Dept: FAMILY MEDICINE | Facility: CLINIC | Age: 74
End: 2024-06-12

## 2024-06-12 VITALS
BODY MASS INDEX: 26.61 KG/M2 | TEMPERATURE: 98 F | SYSTOLIC BLOOD PRESSURE: 160 MMHG | HEART RATE: 69 BPM | OXYGEN SATURATION: 95 % | WEIGHT: 196.44 LBS | HEIGHT: 72 IN | DIASTOLIC BLOOD PRESSURE: 82 MMHG

## 2024-06-12 DIAGNOSIS — Z12.5 SCREENING PSA (PROSTATE SPECIFIC ANTIGEN): ICD-10-CM

## 2024-06-12 DIAGNOSIS — Z00.00 HEALTHCARE MAINTENANCE: ICD-10-CM

## 2024-06-12 DIAGNOSIS — Z12.11 COLON CANCER SCREENING: ICD-10-CM

## 2024-06-12 DIAGNOSIS — R06.2 WHEEZING: ICD-10-CM

## 2024-06-12 DIAGNOSIS — J33.9 NASAL POLYPOSIS: ICD-10-CM

## 2024-06-12 DIAGNOSIS — I10 ESSENTIAL HYPERTENSION: Primary | ICD-10-CM

## 2024-06-12 DIAGNOSIS — J30.1 SEASONAL ALLERGIC RHINITIS DUE TO POLLEN: ICD-10-CM

## 2024-06-12 PROCEDURE — 1160F RVW MEDS BY RX/DR IN RCRD: CPT | Mod: CPTII,S$GLB,, | Performed by: INTERNAL MEDICINE

## 2024-06-12 PROCEDURE — 99204 OFFICE O/P NEW MOD 45 MIN: CPT | Mod: S$GLB,,, | Performed by: INTERNAL MEDICINE

## 2024-06-12 PROCEDURE — 3077F SYST BP >= 140 MM HG: CPT | Mod: CPTII,S$GLB,, | Performed by: INTERNAL MEDICINE

## 2024-06-12 PROCEDURE — 1159F MED LIST DOCD IN RCRD: CPT | Mod: CPTII,S$GLB,, | Performed by: INTERNAL MEDICINE

## 2024-06-12 PROCEDURE — 3288F FALL RISK ASSESSMENT DOCD: CPT | Mod: CPTII,S$GLB,, | Performed by: INTERNAL MEDICINE

## 2024-06-12 PROCEDURE — 4010F ACE/ARB THERAPY RXD/TAKEN: CPT | Mod: CPTII,S$GLB,, | Performed by: INTERNAL MEDICINE

## 2024-06-12 PROCEDURE — 3079F DIAST BP 80-89 MM HG: CPT | Mod: CPTII,S$GLB,, | Performed by: INTERNAL MEDICINE

## 2024-06-12 PROCEDURE — 3008F BODY MASS INDEX DOCD: CPT | Mod: CPTII,S$GLB,, | Performed by: INTERNAL MEDICINE

## 2024-06-12 PROCEDURE — 99999 PR PBB SHADOW E&M-EST. PATIENT-LVL V: CPT | Mod: PBBFAC,,, | Performed by: INTERNAL MEDICINE

## 2024-06-12 PROCEDURE — 1101F PT FALLS ASSESS-DOCD LE1/YR: CPT | Mod: CPTII,S$GLB,, | Performed by: INTERNAL MEDICINE

## 2024-06-12 PROCEDURE — 1126F AMNT PAIN NOTED NONE PRSNT: CPT | Mod: CPTII,S$GLB,, | Performed by: INTERNAL MEDICINE

## 2024-06-12 RX ORDER — HYDROCHLOROTHIAZIDE 12.5 MG/1
12.5 TABLET ORAL DAILY
Qty: 90 TABLET | Refills: 1 | Status: SHIPPED | OUTPATIENT
Start: 2024-06-12 | End: 2024-12-09

## 2024-06-12 RX ORDER — BUDESONIDE AND FORMOTEROL FUMARATE DIHYDRATE 160; 4.5 UG/1; UG/1
2 AEROSOL RESPIRATORY (INHALATION) EVERY 12 HOURS
Qty: 10.2 G | Refills: 0 | Status: SHIPPED | OUTPATIENT
Start: 2024-06-12

## 2024-06-12 RX ORDER — TELMISARTAN 40 MG/1
40 TABLET ORAL DAILY
Qty: 90 TABLET | Refills: 1 | Status: SHIPPED | OUTPATIENT
Start: 2024-06-12 | End: 2024-12-09

## 2024-06-12 RX ORDER — ACETAMINOPHEN 500 MG
TABLET ORAL
Qty: 1 EACH | Refills: 0 | Status: SHIPPED | OUTPATIENT
Start: 2024-06-12

## 2024-06-12 NOTE — PATIENT INSTRUCTIONS
Can't find time to schedule in your steps? Then consider tracking your daily step count.    Physical activity is one of the most important things we can do to improve our health. However, finding time to incorporate an exercise regimen into a daily schedule can often be a challenge. To gain some health benefits, simply increase your number of daily steps and make them more intentional and brisk.  Using a pedometer or a step counting manoj on your phone will help you determine your average baseline steps, allow you to set a personal goal to increase your daily steps and also help to monitor your progress.     The average American walks 3,000 to 4,000 steps a day, or roughly 1.5 to 2 miles. It's a good idea to find out how many steps a day you walk now, as your own baseline. Then you can work up toward the goal of 10,000 steps by aiming to add 1,000 extra steps a day every two weeks.     How Many Steps are Enough?    The average American takes about 5,500 steps per day; the recommendation for good health is 10,000 daily steps. Where do you fall?  More than 12,500 Highly Active  10,000-12,499 Active  7,500-9,999 Somewhat Active  5,000-7,499 Low Active  5,000 Sedentary

## 2024-06-12 NOTE — PROGRESS NOTES
HISTORY OF PRESENT ILLNESS:  David Martin is a 73 y.o. male who presents to the clinic today for Annual Exam    My first encounter with patient.    HTN is being managed with losartan 50 mg daily.  Patient is note checking BP at home at present.  Notes increase salt intake and reduced physical activity in the last several months.  Prior physical activity included a daily walking program.  He is without any symptoms at present except for postnasal drip and hoarseness related to nasal polyposis. Previously seen by ENT for this and is considering surgical procedure for management.    Patient declines vaccination today.    PAST MEDICAL HISTORY:  Past Medical History:   Diagnosis Date    Lipoma 12/19/2014    Nasal polyp, unspecified        PAST SURGICAL HISTORY:  Past Surgical History:   Procedure Laterality Date    COLONOSCOPY N/A 12/21/2016    Procedure: COLONOSCOPY, excision of skin tag (Please schedule early in morning);  Surgeon: Nas Verdugo MD;  Location: [address]);  Service: Endoscopy;  Laterality: N/A;    RHINOPLASTY TIP         SOCIAL HISTORY:  Social History     Socioeconomic History    Marital status: Single   Occupational History    Occupation: owns shipping company   Tobacco Use    Smoking status: Never    Smokeless tobacco: Never   Substance and Sexual Activity    Alcohol use: No    Drug use: Not Currently     Comment: 1 glass wine month    Sexual activity: Yes     Partners: Female     Birth control/protection: Condom     Social Determinants of Health     Financial Resource Strain: Low Risk  (6/12/2024)    Overall Financial Resource Strain (CARDIA)     Difficulty of Paying Living Expenses: Not hard at all   Food Insecurity: No Food Insecurity (6/12/2024)    Hunger Vital Sign     Worried About Running Out of Food in the Last Year: Never true     Ran Out of Food in the Last Year: Never true   Physical Activity: Sufficiently Active (6/12/2024)    Exercise Vital Sign     Days of Exercise per  Week: 4 days     Minutes of Exercise per Session: 40 min   Stress: No Stress Concern Present (6/12/2024)    [employer] [place] [employer] - Occupational Stress Questionnaire     Feeling of Stress : Not at all   Housing Stability: Unknown (6/12/2024)    Housing Stability Vital Sign     Unable to Pay for Housing in the Last Year: No       FAMILY HISTORY:  Family History   Problem Relation Name Age of Onset    Hypertension Mother Mom     No Known Problems Father      Asthma Neg Hx      Emphysema Neg Hx         ALLERGIES AND MEDICATIONS: updated and reviewed.  Review of patient's allergies indicates:   Allergen Reactions    Pollen extracts     Ragweed      Medication List with Changes/Refills   New Medications    BLOOD PRESSURE MONITOR KIT    CHECK BP DAILY.   Current Medications    BUDESONIDE-FORMOTEROL 160-4.5 MCG (SYMBICORT) 160-4.5 MCG/ACTUATION HFAA    Inhale 2 puffs into the lungs every 12 (twelve) hours. Controller    LOSARTAN (COZAAR) 25 MG TABLET    Take 2 tablets (50 mg total) by mouth once daily.   Discontinued Medications    ATORVASTATIN (LIPITOR) 20 MG TABLET    Take 1 tablet (20 mg total) by mouth every evening.      CARE TEAM:  Patient Care Team:  Daksha Colby NP as PCP - General (Family Medicine)  Trisha Mason LPN as Care Coordinator (Internal Medicine)     REVIEW OF SYSTEMS:  Review of Systems   Constitutional:  Negative for chills and fever.   HENT:  Negative for congestion and postnasal drip.    Eyes:  Negative for photophobia and visual disturbance.   Respiratory:  Negative for cough and shortness of breath.    Cardiovascular:  Negative for chest pain and palpitations.   Gastrointestinal:  Negative for nausea and vomiting.   Genitourinary:  Negative for dysuria and frequency.   Musculoskeletal:  Negative for arthralgias and back pain.   Neurological:  Negative for light-headedness and headaches.   Psychiatric/Behavioral:  Negative for dysphoric mood. The patient is not  nervous/anxious.    All other systems reviewed and are negative.    PHYSICAL EXAM:  Vitals:    06/12/24 1608   BP: (!) 160/82   Pulse:    Temp:              Body mass index is 26.64 kg/m².    Physical Examination: General appearance - alert, well appearing, and in no distress  Mental status - alert, oriented to person, place, and time  Eyes - pupils equal and reactive, extraocular eye movements intact  Ears - bilateral TM's and external ear canals normal  Chest - clear to auscultation, no wheezes, rales or rhonchi, symmetric air entry  Heart - normal rate, regular rhythm, normal S1, S2, no murmurs, rubs, clicks or gallops  Abdomen - soft, nontender, nondistended, no masses or organomegaly  Neurological - alert, oriented, normal speech, no focal findings or movement disorder noted  Extremities - peripheral pulses normal, no pedal edema, no clubbing or cyanosis       ASSESSMENT AND PLAN:  Essential hypertension  -     telmisartan (MICARDIS) 40 MG Tab; Take 1 tablet (40 mg total) by mouth once daily. Goal BP<135/80.  Dispense: 90 tablet; Refill: 1  -     hydroCHLOROthiazide (HYDRODIURIL) 12.5 MG Tab; Take 1 tablet (12.5 mg total) by mouth once daily. Goal BP<135/80.  Dispense: 90 tablet; Refill: 1  - Advised for Mediterranean/DASH eating plan.  Advised for increased physical activity and goal BP or <135/80.  Advised for home monitoring and return for nurse BP check.  - Stop losartan.    Screening PSA (prostate specific antigen)  -     PSA, Screening; Future; Expected date: 06/12/2024    Colon cancer screening  -     Ambulatory referral/consult to Endo Procedure ; Future; Expected date: 06/13/2024    Nasal polyposis        - Follow up ENT.    Healthcare maintenance  -     Hemoglobin A1C; Future; Expected date: 06/12/2024  -     Comprehensive Metabolic Panel; Future; Expected date: 06/12/2024  -     Lipid Panel; Future; Expected date: 06/12/2024  -     CBC Auto Differential; Future; Expected date: 06/12/2024  -      TSH; Future; Expected date: 06/12/2024  -     Vitamin D; Future; Expected date: 06/12/2024  -     blood pressure monitor Kit; CHECK BP DAILY.  Dispense: 1 each; Refill: 0  -     PSA, Screening; Future; Expected date: 06/12/2024  -     Ambulatory referral/consult to Endo Procedure ; Future; Expected date: 06/13/2024              Follow up 3 months or sooner as needed.

## 2024-06-13 ENCOUNTER — LAB VISIT (OUTPATIENT)
Dept: LAB | Facility: HOSPITAL | Age: 74
End: 2024-06-13
Attending: INTERNAL MEDICINE
Payer: COMMERCIAL

## 2024-06-13 DIAGNOSIS — Z00.00 HEALTHCARE MAINTENANCE: ICD-10-CM

## 2024-06-13 DIAGNOSIS — Z12.5 SCREENING PSA (PROSTATE SPECIFIC ANTIGEN): ICD-10-CM

## 2024-06-13 LAB
25(OH)D3+25(OH)D2 SERPL-MCNC: 32 NG/ML (ref 30–96)
ALBUMIN SERPL BCP-MCNC: 3.9 G/DL (ref 3.5–5.2)
ALP SERPL-CCNC: 98 U/L (ref 55–135)
ALT SERPL W/O P-5'-P-CCNC: 22 U/L (ref 10–44)
ANION GAP SERPL CALC-SCNC: 6 MMOL/L (ref 8–16)
AST SERPL-CCNC: 19 U/L (ref 10–40)
BASOPHILS # BLD AUTO: 0.07 K/UL (ref 0–0.2)
BASOPHILS NFR BLD: 1.2 % (ref 0–1.9)
BILIRUB SERPL-MCNC: 0.9 MG/DL (ref 0.1–1)
BUN SERPL-MCNC: 18 MG/DL (ref 8–23)
CALCIUM SERPL-MCNC: 9.9 MG/DL (ref 8.7–10.5)
CHLORIDE SERPL-SCNC: 108 MMOL/L (ref 95–110)
CHOLEST SERPL-MCNC: 243 MG/DL (ref 120–199)
CHOLEST/HDLC SERPL: 4.1 {RATIO} (ref 2–5)
CO2 SERPL-SCNC: 26 MMOL/L (ref 23–29)
COMPLEXED PSA SERPL-MCNC: 2.4 NG/ML (ref 0–4)
CREAT SERPL-MCNC: 1.1 MG/DL (ref 0.5–1.4)
DIFFERENTIAL METHOD BLD: ABNORMAL
EOSINOPHIL # BLD AUTO: 0.6 K/UL (ref 0–0.5)
EOSINOPHIL NFR BLD: 9.9 % (ref 0–8)
ERYTHROCYTE [DISTWIDTH] IN BLOOD BY AUTOMATED COUNT: 12.9 % (ref 11.5–14.5)
EST. GFR  (NO RACE VARIABLE): >60 ML/MIN/1.73 M^2
ESTIMATED AVG GLUCOSE: 114 MG/DL (ref 68–131)
GLUCOSE SERPL-MCNC: 101 MG/DL (ref 70–110)
HBA1C MFR BLD: 5.6 % (ref 4–5.6)
HCT VFR BLD AUTO: 42.3 % (ref 40–54)
HDLC SERPL-MCNC: 59 MG/DL (ref 40–75)
HDLC SERPL: 24.3 % (ref 20–50)
HGB BLD-MCNC: 15 G/DL (ref 14–18)
IMM GRANULOCYTES # BLD AUTO: 0.02 K/UL (ref 0–0.04)
IMM GRANULOCYTES NFR BLD AUTO: 0.3 % (ref 0–0.5)
LDLC SERPL CALC-MCNC: 173.2 MG/DL (ref 63–159)
LYMPHOCYTES # BLD AUTO: 1.8 K/UL (ref 1–4.8)
LYMPHOCYTES NFR BLD: 30.8 % (ref 18–48)
MCH RBC QN AUTO: 31.9 PG (ref 27–31)
MCHC RBC AUTO-ENTMCNC: 35.5 G/DL (ref 32–36)
MCV RBC AUTO: 90 FL (ref 82–98)
MONOCYTES # BLD AUTO: 0.5 K/UL (ref 0.3–1)
MONOCYTES NFR BLD: 7.8 % (ref 4–15)
NEUTROPHILS # BLD AUTO: 2.9 K/UL (ref 1.8–7.7)
NEUTROPHILS NFR BLD: 50 % (ref 38–73)
NONHDLC SERPL-MCNC: 184 MG/DL
NRBC BLD-RTO: 0 /100 WBC
PLATELET # BLD AUTO: 260 K/UL (ref 150–450)
PMV BLD AUTO: 10.4 FL (ref 9.2–12.9)
POTASSIUM SERPL-SCNC: 4.4 MMOL/L (ref 3.5–5.1)
PROT SERPL-MCNC: 6.9 G/DL (ref 6–8.4)
RBC # BLD AUTO: 4.7 M/UL (ref 4.6–6.2)
SODIUM SERPL-SCNC: 140 MMOL/L (ref 136–145)
TRIGL SERPL-MCNC: 54 MG/DL (ref 30–150)
TSH SERPL DL<=0.005 MIU/L-ACNC: 1.86 UIU/ML (ref 0.4–4)
WBC # BLD AUTO: 5.88 K/UL (ref 3.9–12.7)

## 2024-06-13 PROCEDURE — 85025 COMPLETE CBC W/AUTO DIFF WBC: CPT | Performed by: INTERNAL MEDICINE

## 2024-06-13 PROCEDURE — 84153 ASSAY OF PSA TOTAL: CPT | Performed by: INTERNAL MEDICINE

## 2024-06-13 PROCEDURE — 84443 ASSAY THYROID STIM HORMONE: CPT | Performed by: INTERNAL MEDICINE

## 2024-06-13 PROCEDURE — 36415 COLL VENOUS BLD VENIPUNCTURE: CPT | Performed by: INTERNAL MEDICINE

## 2024-06-13 PROCEDURE — 80061 LIPID PANEL: CPT | Performed by: INTERNAL MEDICINE

## 2024-06-13 PROCEDURE — 80053 COMPREHEN METABOLIC PANEL: CPT | Performed by: INTERNAL MEDICINE

## 2024-06-13 PROCEDURE — 82306 VITAMIN D 25 HYDROXY: CPT | Performed by: INTERNAL MEDICINE

## 2024-06-13 PROCEDURE — 83036 HEMOGLOBIN GLYCOSYLATED A1C: CPT | Performed by: INTERNAL MEDICINE

## 2024-06-19 ENCOUNTER — TELEPHONE (OUTPATIENT)
Dept: FAMILY MEDICINE | Facility: CLINIC | Age: 74
End: 2024-06-19
Payer: COMMERCIAL

## 2024-06-19 DIAGNOSIS — Z91.89 AT HIGH RISK FOR CARDIOVASCULAR DISEASE: ICD-10-CM

## 2024-06-19 DIAGNOSIS — E78.5 HYPERLIPIDEMIA, UNSPECIFIED HYPERLIPIDEMIA TYPE: Primary | ICD-10-CM

## 2024-06-19 NOTE — TELEPHONE ENCOUNTER
----- Message from Zack Olivera MD sent at 6/19/2024  1:23 PM CDT -----  Message sent on portal.    Please call to schedule virtual in one month:  Your cholesterol (specifically LDL) is high.  In addition to following a Mediterranean diet and staying active, it is recommended for you be on a statin medication to reduce risk for cardiovascular disease.  Let's follow up within the month by virtual visitto discuss this recommendation.

## 2024-07-12 ENCOUNTER — TELEPHONE (OUTPATIENT)
Dept: FAMILY MEDICINE | Facility: CLINIC | Age: 74
End: 2024-07-12
Payer: MEDICARE

## 2024-07-12 NOTE — TELEPHONE ENCOUNTER
----- Message from Lindsay Markham sent at 7/12/2024  8:18 AM CDT -----  Type : Patient Call          Who Called : Patient           Does the patient know what this is regarding?: Pt is requesting a callback to discuss recent medication change ; pt says he will no longer be taking telmisartan (MICARDIS) 40 MG Tab ;  he states it's starting to mess with his pulse and he doesn't like the feeling ; pt is wanting to go back to original medication ; Pt would like a call back to discuss getting med's sent to him ; pt is currently traveling ; Pt is ok with communication vis phone or portal ; please advise               Would the patient rather a call back or a response via [facility]? Call                Best Call Back Number: [phone]            Additional Information:

## 2024-07-17 DIAGNOSIS — I10 ESSENTIAL HYPERTENSION: Primary | ICD-10-CM

## 2024-07-17 RX ORDER — LOSARTAN POTASSIUM 50 MG/1
50 TABLET ORAL DAILY
Qty: 90 TABLET | Refills: 1 | Status: SHIPPED | OUTPATIENT
Start: 2024-07-17 | End: 2025-01-13

## 2024-09-06 ENCOUNTER — TELEPHONE (OUTPATIENT)
Dept: ALLERGY | Facility: CLINIC | Age: 74
End: 2024-09-06
Payer: MEDICARE

## 2024-10-18 ENCOUNTER — PATIENT MESSAGE (OUTPATIENT)
Dept: ADMINISTRATIVE | Facility: HOSPITAL | Age: 74
End: 2024-10-18
Payer: MEDICARE

## 2024-10-31 ENCOUNTER — TELEPHONE (OUTPATIENT)
Dept: ALLERGY | Facility: CLINIC | Age: 74
End: 2024-10-31
Payer: MEDICARE

## 2024-11-15 ENCOUNTER — HOSPITAL ENCOUNTER (OUTPATIENT)
Dept: RADIOLOGY | Facility: HOSPITAL | Age: 74
Discharge: HOME | End: 2024-11-15
Attending: ALLERGY & IMMUNOLOGY
Payer: MEDICARE

## 2024-11-15 ENCOUNTER — HOSPITAL ENCOUNTER (OUTPATIENT)
Dept: PULMONOLOGY | Facility: CLINIC | Age: 74
Discharge: HOME | End: 2024-11-15
Payer: MEDICARE

## 2024-11-15 ENCOUNTER — OFFICE VISIT (OUTPATIENT)
Dept: ALLERGY | Facility: CLINIC | Age: 74
End: 2024-11-15
Payer: MEDICARE

## 2024-11-15 VITALS — HEIGHT: 72 IN | WEIGHT: 192.88 LBS | BODY MASS INDEX: 26.12 KG/M2

## 2024-11-15 DIAGNOSIS — J33.9 NASAL POLYPOSIS: Primary | ICD-10-CM

## 2024-11-15 DIAGNOSIS — R05.9 COUGH, UNSPECIFIED TYPE: ICD-10-CM

## 2024-11-15 DIAGNOSIS — J31.0 CHRONIC RHINITIS: ICD-10-CM

## 2024-11-15 DIAGNOSIS — R06.2 WHEEZING: ICD-10-CM

## 2024-11-15 DIAGNOSIS — J30.1 SEASONAL ALLERGIC RHINITIS DUE TO POLLEN: ICD-10-CM

## 2024-11-15 DIAGNOSIS — J33.9 NASAL POLYPOSIS: ICD-10-CM

## 2024-11-15 LAB
DLCO SINGLE BREATH LLN: 20.96
DLCO SINGLE BREATH PRE REF: 79.4 %
DLCO SINGLE BREATH REF: 27.88
DLCOC SBVA LLN: 2.62
DLCOC SBVA REF: 3.7
DLCOC SINGLE BREATH LLN: 20.96
DLCOC SINGLE BREATH REF: 27.88
DLCOCSBVAULN: 4.78
DLCOCSINGLEBREATHULN: 34.81
DLCOSINGLEBREATHULN: 34.81
DLCOSINGLEBREATHZSCORE: -1.36
DLCOVA LLN: 2.62
DLCOVA PRE REF: 94.2 %
DLCOVA PRE: 3.49 ML/(MIN*MMHG*L) (ref 2.62–4.78)
DLCOVA REF: 3.7
DLCOVAULN: 4.78
ERV LLN: -16448.95
ERV PRE REF: 109 %
ERV REF: 1.05
ERVULN: ABNORMAL
FEF 25 75 LLN: 1.31
FEF 25 75 PRE REF: 53.5 %
FEF 25 75 REF: 3.02
FET100 CHG: -2.5 %
FEV05 LLN: 1.61
FEV05 REF: 2.75
FEV1 CHG: 1.4 %
FEV1 FVC LLN: 61
FEV1 FVC PRE REF: 91.9 %
FEV1 FVC REF: 75
FEV1 LLN: 2.28
FEV1 PRE REF: 85.5 %
FEV1 REF: 3.23
FEV1 VOL CHG: 0.04
FEV1FVCZSCORE: -0.74
FEV1ZSCORE: -0.83
FRCPLETH LLN: 2.88
FRCPLETH PREREF: 86.7 %
FRCPLETH REF: 3.86
FRCPLETHULN: 4.85
FVC CHG: -0.8 %
FVC LLN: 3.17
FVC PRE REF: 92.3 %
FVC REF: 4.34
FVC VOL CHG: -0.03
FVCZSCORE: -0.47
IVC PRE: 4.16 L (ref 3.17–5.52)
IVC SINGLE BREATH LLN: 3.17
IVC SINGLE BREATH PRE REF: 95.8 %
IVC SINGLE BREATH REF: 4.34
IVCSINGLEBREATHULN: 5.52
LLN IC: -9999996.64
PEF LLN: 5.84
PEF PRE REF: 118.9 %
PEF REF: 8.29
POST FEF 25 75: 1.64 L/S (ref 1.31–4.73)
POST FET 100: 6.58 SEC
POST FEV1 FVC: 70.3 % (ref 60.86–87.49)
POST FEV1: 2.8 L (ref 2.28–4.11)
POST FEV5: 2.14 L (ref 1.61–3.88)
POST FVC: 3.98 L (ref 3.17–5.52)
POST PEF: 8.96 L/S (ref 5.84–10.74)
PRE DLCO: 22.14 ML/(MIN*MMHG) (ref 20.96–34.81)
PRE ERV: 1.14 L (ref -16448.95–16451.05)
PRE FEF 25 75: 1.62 L/S (ref 1.31–4.73)
PRE FET 100: 6.75 SEC
PRE FEV05 REF: 76.3 %
PRE FEV1 FVC: 68.83 % (ref 60.86–87.49)
PRE FEV1: 2.76 L (ref 2.28–4.11)
PRE FEV5: 2.1 L (ref 1.61–3.88)
PRE FRC PL: 3.35 L (ref 2.88–4.85)
PRE FVC: 4.01 L (ref 3.17–5.52)
PRE IC: 3.02 L (ref -9999996.64–#######.####)
PRE PEF: 9.86 L/S (ref 5.84–10.74)
PRE REF IC: 89.9 %
PRE RV: 2.21 L (ref 2.14–3.49)
PRE TLC: 6.37 L (ref 6.38–8.68)
RAW PRE REF: 86.5 %
RAW PRE: 2.65 CMH2O*S/L (ref 3.06–3.06)
RAW REF: 3.06
REF IC: 3.36
RV LLN: 2.14
RV PRE REF: 78.4 %
RV REF: 2.82
RVTLC LLN: 34
RVTLC PRE REF: 80.2 %
RVTLC PRE: 34.67 % (ref 34.23–52.19)
RVTLC REF: 43
RVTLCULN: 52
RVULN: 3.49
SGAW PRE REF: 108.3 %
SGAW PRE: 0.09 1/(CMH2O*S) (ref 0.08–0.08)
SGAW REF: 0.08
TLC LLN: 6.38
TLC PRE REF: 84.5 %
TLC REF: 7.53
TLC ULN: 8.68
TLCZSCORE: -1.66
ULN IC: ABNORMAL
VA PRE: 6.35 L (ref 7.38–7.38)
VA SINGLE BREATH LLN: 7.38
VA SINGLE BREATH PRE REF: 86 %
VA SINGLE BREATH REF: 7.38
VASINGLEBREATHULN: 7.38
VC LLN: 3.17
VC PRE REF: 95.8 %
VC PRE: 4.16 L (ref 3.17–5.52)
VC REF: 4.34
VC ULN: 5.52

## 2024-11-15 PROCEDURE — 99213 OFFICE O/P EST LOW 20 MIN: CPT | Mod: PBBFAC,25 | Performed by: ALLERGY & IMMUNOLOGY

## 2024-11-15 PROCEDURE — 70486 CT MAXILLOFACIAL W/O DYE: CPT | Mod: TC

## 2024-11-15 PROCEDURE — 94726 PLETHYSMOGRAPHY LUNG VOLUMES: CPT | Mod: 26,S$PBB,, | Performed by: INTERNAL MEDICINE

## 2024-11-15 PROCEDURE — 99999 PR PBB SHADOW E&M-EST. PATIENT-LVL III: CPT | Mod: PBBFAC,,, | Performed by: ALLERGY & IMMUNOLOGY

## 2024-11-15 PROCEDURE — 94060 EVALUATION OF WHEEZING: CPT | Mod: 26,S$PBB,, | Performed by: INTERNAL MEDICINE

## 2024-11-15 PROCEDURE — 70486 CT MAXILLOFACIAL W/O DYE: CPT | Mod: 26,,, | Performed by: RADIOLOGY

## 2024-11-15 PROCEDURE — 94729 DIFFUSING CAPACITY: CPT | Mod: 26,S$PBB,, | Performed by: INTERNAL MEDICINE

## 2024-11-15 PROCEDURE — 94060 EVALUATION OF WHEEZING: CPT | Mod: PBBFAC | Performed by: INTERNAL MEDICINE

## 2024-11-15 PROCEDURE — 94726 PLETHYSMOGRAPHY LUNG VOLUMES: CPT | Mod: PBBFAC | Performed by: INTERNAL MEDICINE

## 2024-11-15 PROCEDURE — 94729 DIFFUSING CAPACITY: CPT | Mod: PBBFAC | Performed by: INTERNAL MEDICINE

## 2024-11-15 RX ORDER — BUDESONIDE AND FORMOTEROL FUMARATE DIHYDRATE 160; 4.5 UG/1; UG/1
2 AEROSOL RESPIRATORY (INHALATION) EVERY 12 HOURS
Qty: 10.2 G | Refills: 11 | Status: SHIPPED | OUTPATIENT
Start: 2024-11-15

## 2024-11-15 NOTE — PROGRESS NOTES
David Martin is a 74-year-old male who presents to clinic today for evaluation of chronic rhinitis and cough.  He is here alone.  He was last seen by me on July 5, 2016.     After his last visit, he moved to [place], [place].        He did see Dr. Goetz who recommended sinus surgery for his nasal polyposis and sinusitis. He did not have.    He has continued to have increased rhinitis that has been increasing in severity.  He now has postnasal drip, frequent throat clearing, a sensation that something is in the back of the throat,  occasional hoarseness, and a cough with wheezing.    He has been taking Symbicort with improvement in his symptoms.  He does not have albuterol.     He only occasionally has heartburn if he eats late at night.    For ROS, FH, SH please see Allergy and Asthma Questionnaire dated today.    Some relevant pertinent positives:    Review of Systems/PMH:  He has had elevated blood pressure in the past but is no longer requiring any medications.    Family History:  Negative for atopic disease.    Home environment:   He has lived in the same house for the past two years. There was no water damage. There is no evidence of mold.  There is no carpeting in the bedroom. There are no pets.    Social History:   He is a nonsmoker. He is retired.  His fiancee vapes.    Physical Examination:  General: Well-developed, well-nourished, no acute distress.  Head: No sinus tenderness.  Eyes: Conjunctivae:  No bulbar or palpebral conjunctival injection.  Ears: EAC's clear.  TM's clear.  No pre-auricular nodes.  Nose: Nasal Mucosa:  Pink.  Septum: No apparent deviation.  Turbinates:  No significant edema.  Polyps/Mass:  None visible.  Teeth/Gums:  No bleeding noted.  Oropharynx: No exudates.  Neck: Supple without thyromegaly. No cervical lymphadenopathy.    Respiratory/Chest: Effort: Good.  Auscultation:  Clear bilaterally.  Cardiovascular:  No murmur, rubs, or gallop heard.   GI:  Non-tender.  No masses.   No organomegaly.  Extremities:  No cyanosis, clubbing, or edema.  Skin: Good turgor.  No urticaria or angioedema.  Neuro/Psych: Oriented x 3.    Laboratory 6/28/2016:  ImmunoCAP: Negative.    Spirometry 6/28/2018:  Normal spirometry. Airflow not improved after bronchodilator.    Inhalant skin tests 7/5/2016:  3+ histamine control. All tests are negative.    Sinus CT 02/07/2019:  Findings of sinonasal polyposis in addition to pansinus disease with near complete opacification of the paranasal sinuses and internal regions of high density suggesting inspissated secretions or fungal colonization.  Bilateral nasal bone fractures are present at the base of the nasal bones, with mild medial displacement of the fracture fragments, more pronounced on the left than the right.    Chest x-ray 01/14/2023:  The cardiomediastinal contour is within normal limits. The lungs are clear. No pneumothorax. No pleural effusions.     Assessment:  1. Chronic rhinitis, not allergic.  2. Chronic cough with wheezing, not allergic.  3. Chronic sinusitis.  4. Nasal polyposis.  5. Mild GERD.    Recommendations:  1. Laboratory as ordered.  2. Sinus CT.   3. Complete PFT.   4. Continue Symbicort daily.   5. ENT evaluation.  6. Return to clinic in one week.  7. Consider Dupixent.  This was discussed.    4. LPR reviewed.

## 2024-11-15 NOTE — Clinical Note
Ed, This man has seen you in the past.  Take a look at his sinus CT from today.  He can't get in to see you until March 2025.  I'd like to start him on Dupixent once you see him.  Thanks, Sid

## 2024-11-18 ENCOUNTER — PATIENT MESSAGE (OUTPATIENT)
Dept: OTOLARYNGOLOGY | Facility: CLINIC | Age: 74
End: 2024-11-18
Payer: MEDICARE

## 2024-11-20 ENCOUNTER — TELEPHONE (OUTPATIENT)
Dept: ALLERGY | Facility: CLINIC | Age: 74
End: 2024-11-20
Payer: MEDICARE

## 2024-11-20 RX ORDER — FLUTICASONE FUROATE AND VILANTEROL 200; 25 UG/1; UG/1
1 POWDER RESPIRATORY (INHALATION) DAILY
Qty: 60 EACH | Refills: 5 | Status: SHIPPED | OUTPATIENT
Start: 2024-11-20

## 2024-11-20 NOTE — TELEPHONE ENCOUNTER
Via fax from [facility] patient's Symbicort will only be covered for 30 days and alternative medications covered are Advair HFA, Breo Ellipta and Dulera.     Original letter sent to scanning.

## 2024-11-26 ENCOUNTER — TELEPHONE (OUTPATIENT)
Dept: ALLERGY | Facility: CLINIC | Age: 74
End: 2024-11-26
Payer: MEDICARE

## 2024-11-26 NOTE — TELEPHONE ENCOUNTER
----- Message from VALERIE Rivera MD sent at 11/25/2024  1:13 PM CST -----  Needs RTC for pneumovax and visit.  ----- Message -----  From: Jeff "[id number] Lab Interface  Sent: 11/15/2024   4:20 PM CST  To: VALERIE Rivera III, MD

## 2024-11-26 NOTE — TELEPHONE ENCOUNTER
Phone call to patient. No answer, message left for patient to call our office. Calling to notify patient of below :    VALERIE Rivera III, MD Whitaker, Melissa, LPN  Needs RTC for pneumovax and visit.

## 2025-04-21 ENCOUNTER — TELEPHONE (OUTPATIENT)
Dept: OTOLARYNGOLOGY | Facility: CLINIC | Age: 75
End: 2025-04-21
Payer: MEDICARE

## 2025-04-21 NOTE — TELEPHONE ENCOUNTER
----- Message from Denia sent at 4/21/2025  9:40 AM CDT -----  Type:  Same Day Appointment RequestCaller is requesting a same day appointment.  Caller declined first available appointment listed below.  Name of Caller:Pt When is the first available appointment?04/22 Symptoms:sinusBest Call Back Number:[phone] Information: need to reschedule appt for after May 5...[place]